# Patient Record
Sex: FEMALE | Race: OTHER | Employment: STUDENT | URBAN - METROPOLITAN AREA
[De-identification: names, ages, dates, MRNs, and addresses within clinical notes are randomized per-mention and may not be internally consistent; named-entity substitution may affect disease eponyms.]

---

## 2022-02-08 ENCOUNTER — HOSPITAL ENCOUNTER (EMERGENCY)
Age: 22
Discharge: HOME | End: 2022-02-08
Attending: EMERGENCY MEDICINE
Payer: MEDICAID

## 2022-02-08 ENCOUNTER — APPOINTMENT (OUTPATIENT)
Dept: ULTRASOUND IMAGING | Age: 22
End: 2022-02-08
Attending: PHYSICIAN ASSISTANT
Payer: MEDICAID

## 2022-02-08 VITALS
OXYGEN SATURATION: 99 % | RESPIRATION RATE: 18 BRPM | SYSTOLIC BLOOD PRESSURE: 129 MMHG | DIASTOLIC BLOOD PRESSURE: 75 MMHG | WEIGHT: 124 LBS | TEMPERATURE: 98 F | HEART RATE: 81 BPM

## 2022-02-08 DIAGNOSIS — O03.4 INCOMPLETE ABORTION: Primary | ICD-10-CM

## 2022-02-08 DIAGNOSIS — O02.0 BLIGHTED OVUM: ICD-10-CM

## 2022-02-08 PROBLEM — O03.9 SPONTANEOUS MISCARRIAGE (HCC): Status: ACTIVE | Noted: 2022-02-08

## 2022-02-08 PROBLEM — O03.9 SPONTANEOUS MISCARRIAGE: Status: ACTIVE | Noted: 2022-02-08

## 2022-02-08 LAB
ALBUMIN SERPL-MCNC: 4 G/DL (ref 3.4–5)
ALBUMIN/GLOB SERPL: 1.1 {RATIO} (ref 1–2)
ALP LIVER SERPL-CCNC: 58 U/L
ALT SERPL-CCNC: 18 U/L
ANION GAP SERPL CALC-SCNC: 5 MMOL/L (ref 0–18)
AST SERPL-CCNC: 11 U/L (ref 15–37)
B-HCG SERPL-ACNC: ABNORMAL MIU/ML
BASOPHILS NFR BLD AUTO: 0.3 %
BILIRUB SERPL-MCNC: 1 MG/DL (ref 0.1–2)
BILIRUB UR QL STRIP.AUTO: NEGATIVE
BUN BLD-MCNC: 6 MG/DL (ref 7–18)
CALCIUM BLD-MCNC: 9.4 MG/DL (ref 8.5–10.1)
CHLORIDE SERPL-SCNC: 108 MMOL/L (ref 98–112)
CO2 SERPL-SCNC: 27 MMOL/L (ref 21–32)
CREAT BLD-MCNC: 0.83 MG/DL
EOSINOPHIL # BLD AUTO: 0.1 X10(3) UL (ref 0–0.7)
EOSINOPHIL NFR BLD AUTO: 1 %
ERYTHROCYTE [DISTWIDTH] IN BLOOD BY AUTOMATED COUNT: 13.1 %
GLOBULIN PLAS-MCNC: 3.6 G/DL (ref 2.8–4.4)
GLUCOSE BLD-MCNC: 87 MG/DL (ref 70–99)
GLUCOSE UR STRIP.AUTO-MCNC: NEGATIVE MG/DL
HCT VFR BLD AUTO: 40.8 %
HGB BLD-MCNC: 14 G/DL
IMM GRANULOCYTES # BLD AUTO: 0.02 X10(3) UL (ref 0–1)
IMM GRANULOCYTES NFR BLD: 0.2 %
KETONES UR STRIP.AUTO-MCNC: NEGATIVE MG/DL
LYMPHOCYTES # BLD AUTO: 2.28 X10(3) UL (ref 1.5–5)
LYMPHOCYTES NFR BLD AUTO: 23.7 %
MCH RBC QN AUTO: 30.6 PG (ref 26–34)
MCHC RBC AUTO-ENTMCNC: 34.3 G/DL (ref 31–37)
MCV RBC AUTO: 89.1 FL
MONOCYTES # BLD AUTO: 0.86 X10(3) UL (ref 0.1–1)
MONOCYTES NFR BLD AUTO: 8.9 %
NEUTROPHILS # BLD AUTO: 6.33 X10 (3) UL (ref 1.5–7.7)
NEUTROPHILS # BLD AUTO: 6.33 X10(3) UL (ref 1.5–7.7)
NEUTROPHILS NFR BLD AUTO: 65.9 %
NITRITE UR QL STRIP.AUTO: NEGATIVE
OSMOLALITY SERPL CALC.SUM OF ELEC: 287 MOSM/KG (ref 275–295)
PH UR STRIP.AUTO: 7 [PH] (ref 5–8)
PLATELET # BLD AUTO: 213 10(3)UL (ref 150–450)
POTASSIUM SERPL-SCNC: 3.5 MMOL/L (ref 3.5–5.1)
PROT SERPL-MCNC: 7.6 G/DL (ref 6.4–8.2)
PROT UR STRIP.AUTO-MCNC: NEGATIVE MG/DL
RBC # BLD AUTO: 4.58 X10(6)UL
RBC #/AREA URNS AUTO: >10 /HPF
RH BLOOD TYPE: POSITIVE
SODIUM SERPL-SCNC: 140 MMOL/L (ref 136–145)
SP GR UR STRIP.AUTO: 1.02 (ref 1–1.03)
WBC # BLD AUTO: 9.6 X10(3) UL (ref 4–11)

## 2022-02-08 PROCEDURE — 86900 BLOOD TYPING SEROLOGIC ABO: CPT | Performed by: PHYSICIAN ASSISTANT

## 2022-02-08 PROCEDURE — 96360 HYDRATION IV INFUSION INIT: CPT

## 2022-02-08 PROCEDURE — 76817 TRANSVAGINAL US OBSTETRIC: CPT | Performed by: PHYSICIAN ASSISTANT

## 2022-02-08 PROCEDURE — 84702 CHORIONIC GONADOTROPIN TEST: CPT | Performed by: PHYSICIAN ASSISTANT

## 2022-02-08 PROCEDURE — 80053 COMPREHEN METABOLIC PANEL: CPT | Performed by: PHYSICIAN ASSISTANT

## 2022-02-08 PROCEDURE — 85025 COMPLETE CBC W/AUTO DIFF WBC: CPT | Performed by: PHYSICIAN ASSISTANT

## 2022-02-08 PROCEDURE — 87086 URINE CULTURE/COLONY COUNT: CPT | Performed by: PHYSICIAN ASSISTANT

## 2022-02-08 PROCEDURE — 86901 BLOOD TYPING SEROLOGIC RH(D): CPT | Performed by: PHYSICIAN ASSISTANT

## 2022-02-08 PROCEDURE — 99285 EMERGENCY DEPT VISIT HI MDM: CPT

## 2022-02-08 PROCEDURE — 81015 MICROSCOPIC EXAM OF URINE: CPT | Performed by: PHYSICIAN ASSISTANT

## 2022-02-08 PROCEDURE — 76801 OB US < 14 WKS SINGLE FETUS: CPT | Performed by: PHYSICIAN ASSISTANT

## 2022-02-08 PROCEDURE — 81001 URINALYSIS AUTO W/SCOPE: CPT | Performed by: PHYSICIAN ASSISTANT

## 2022-02-08 PROCEDURE — 99284 EMERGENCY DEPT VISIT MOD MDM: CPT

## 2022-02-08 RX ORDER — PRENATAL VIT/IRON FUM/FOLIC AC 27MG-0.8MG
1 TABLET ORAL DAILY
COMMUNITY

## 2022-02-08 NOTE — ED QUICK NOTES
Rounding Completed    Plan of Care reviewed. Waiting for pt's bladder to feel full for ultrasound. Provided pt with water to drink. Fluids finished and documented. Family at bedside for comfort    Bed is locked and in lowest position. Call light within reach.

## 2022-02-08 NOTE — ED QUICK NOTES
This RN at the bedside with [facility] for pelvic exam. Provided pt with wipes, a pad and underwear

## 2022-02-24 ENCOUNTER — TELEPHONE (OUTPATIENT)
Dept: OBGYN UNIT | Facility: HOSPITAL | Age: 22
End: 2022-02-24

## 2022-11-28 NOTE — ED INITIAL ASSESSMENT (HPI)
Pt states she started bleeding this morning and is 2 mos pregnant. . Pt also is cramping.
MOLECULAR PCR

## 2023-08-01 ENCOUNTER — TELEPHONE (OUTPATIENT)
Dept: OBGYN CLINIC | Facility: CLINIC | Age: 23
End: 2023-08-01

## 2023-08-01 NOTE — TELEPHONE ENCOUNTER
Pt Name and  verified. Pt states that she is requesting an US for tomorrow due to some light bleeding. Pt is currently 6 weeks and the spotting. Pt has not seen MW for pregnancy. Pt offered apt today with MW and pt unable to come in. Pt to come in tomorrow to see MBW. Pt advised to go to ED if bleeding becomes red in color or she experiences pain.  VU.

## 2023-08-02 ENCOUNTER — OFFICE VISIT (OUTPATIENT)
Dept: OBGYN CLINIC | Facility: CLINIC | Age: 23
End: 2023-08-02

## 2023-08-02 VITALS — DIASTOLIC BLOOD PRESSURE: 69 MMHG | SYSTOLIC BLOOD PRESSURE: 108 MMHG | WEIGHT: 130 LBS

## 2023-08-02 DIAGNOSIS — N92.6 MISSED MENSES: ICD-10-CM

## 2023-08-02 DIAGNOSIS — O21.9 NAUSEA AND VOMITING DURING PREGNANCY: Primary | ICD-10-CM

## 2023-08-02 LAB
CONTROL LINE PRESENT WITH A CLEAR BACKGROUND (YES/NO): YES YES/NO
KIT LOT #: NORMAL NUMERIC
PREGNANCY TEST, URINE: POSITIVE

## 2023-08-02 PROCEDURE — 81025 URINE PREGNANCY TEST: CPT | Performed by: ADVANCED PRACTICE MIDWIFE

## 2023-08-02 PROCEDURE — 3078F DIAST BP <80 MM HG: CPT | Performed by: ADVANCED PRACTICE MIDWIFE

## 2023-08-02 PROCEDURE — 3074F SYST BP LT 130 MM HG: CPT | Performed by: ADVANCED PRACTICE MIDWIFE

## 2023-08-02 PROCEDURE — 99203 OFFICE O/P NEW LOW 30 MIN: CPT | Performed by: ADVANCED PRACTICE MIDWIFE

## 2023-08-02 NOTE — PROGRESS NOTES
HPI:   Jluis Luz is a 26year old female who presents for a missed menses visit. Happy about pregnancy. FOB mother rosaline. Had er visit at [address] [place] [address] yesterday - got fluids. Abigail Presume was able to see normal heartbeat on ultrasound. Wt Readings from Last 3 Encounters:  23 : 130 lb (59 kg)  22 : 124 lb (56.2 kg) (42 %, Z= -0.20)*  16 : 104 lb (47.2 kg) (37 %, Z= -0.34)*    * Growth percentiles are based on CDC (Girls, 2-20 Years) data. There is no height or weight on file to calculate BMI. OB History    Para Term  AB Living   2       1     SAB IAB Ectopic Multiple Live Births   1              # Outcome Date GA Lbr Babak/2nd Weight Sex Delivery Anes PTL Lv   2 Current            1 SAB  8w0d             Birth Comments: No D&C        Current Outpatient Medications   Medication Sig Dispense Refill    Prenatal 27-0.8 MG Oral Tab Take 1 tablet by mouth daily. History reviewed. No pertinent past medical history. History reviewed. No pertinent surgical history. Family History   Problem Relation Age of Onset    Stroke Maternal Grandmother     Cancer Paternal Grandmother         ovarian cancer? Heart Disease Neg       Social History:   Social History     Socioeconomic History    Marital status: Single   Tobacco Use    Smoking status: Never    Tobacco comments:     No smokers in home. Substance and Sexual Activity    Alcohol use: Not Currently    Drug use: Never        REVIEW OF SYSTEMS:   GENERAL: tired, denies fever, chills and bodyaches. BREAST: some diffuse breast tenderness  GI: denies abdominal pain, +nausea + vomiting  : denies urinary complaints (frequency, dysuria, urgency), denies malodorous vaginal discharge or itching, reports periods regular prior to missed menses   MUSCULOSKELETAL: denies back pain  PSYCHE: denies depression or anxiety.     EXAM:   /69   Wt 130 lb (59 kg)   LMP 2023 (Exact Date)   GENERAL: well developed, well nourished,in no apparent distress  Normal mucous membranes. LUNGS: normal effort  GI: no masses, hernia or tenderness  NEURO: Oriented times three, motor and sensory are grossly intact    ASSESSMENT AND PLAN:   Vinay Monterroso is a 26year old female who presents for an ER followup     1. Missed menses  - URINE PREGNANCY TEST  Records request from ER visit/   CNM care, philosophy and protocols reviewed and accepted. Discussed folic acid & B6 supplementation. Appropriate for RN education visit. Early ultrasound / blood work to confirm viability discussed. Reviewed danger signs of miscarriage and CNM contact information. Requesting work restrictions. Advised not many at this time. Note for visit today.      Pastora Rodriguez [place]  8/2/2023  11:57 AM

## 2023-08-07 ENCOUNTER — TELEPHONE (OUTPATIENT)
Dept: OBGYN CLINIC | Facility: CLINIC | Age: 23
End: 2023-08-07

## 2023-08-22 ENCOUNTER — NURSE ONLY (OUTPATIENT)
Dept: OBGYN CLINIC | Facility: CLINIC | Age: 23
End: 2023-08-22

## 2023-08-22 DIAGNOSIS — Z34.91 ENCOUNTER FOR SUPERVISION OF NORMAL PREGNANCY IN FIRST TRIMESTER, UNSPECIFIED GRAVIDITY: Primary | ICD-10-CM

## 2023-08-22 NOTE — PROGRESS NOTES
/Pt called today for RN [facility] Education. Missed menses apt with:  Allison CNM  LMP: 23  Pre  BMI: 22.14  EPDS score: 0/30  +UPT in office:     US: Per pt, US was completed at [facility] ED. Pt advised to send US report via [employer] or bring copy to next visit. Working LIZZ: 3/25/23  Hx of genetic abnormality in family: FOB sister with Down's Syndrome    OUD Screening: Pt. Has answered NO 5P questions and has NO  risk factors. Educational material reviewed with patient: Prenatal care, nutrition, fetal movement, labor and  labor, warning signs, food safety, tdap, breastfeeding, circumcision, and Group B strep. PN labs: 1st trimester labs    Optional genetic screening labs were reviewed: Bruce Mike, FTS with US, Quad screen MSAFP and CF screening. Pt declines at this time, but wants to think about it. Pt advised to call office or send message if she changes her mind.  VU.    [facility] Media Policy: reviewed    NOB apt:

## 2023-09-14 ENCOUNTER — LAB ENCOUNTER (OUTPATIENT)
Dept: LAB | Age: 23
End: 2023-09-14
Attending: ADVANCED PRACTICE MIDWIFE
Payer: MEDICAID

## 2023-09-14 DIAGNOSIS — Z34.91 ENCOUNTER FOR SUPERVISION OF NORMAL PREGNANCY IN FIRST TRIMESTER, UNSPECIFIED GRAVIDITY: ICD-10-CM

## 2023-09-14 LAB
ANTIBODY SCREEN: NEGATIVE
BASOPHILS # BLD AUTO: 0.02 X10(3) UL (ref 0–0.2)
BASOPHILS NFR BLD AUTO: 0.3 %
EOSINOPHIL # BLD AUTO: 0.1 X10(3) UL (ref 0–0.7)
EOSINOPHIL NFR BLD AUTO: 1.5 %
ERYTHROCYTE [DISTWIDTH] IN BLOOD BY AUTOMATED COUNT: 12.5 %
HBV SURFACE AG SER-ACNC: <0.1 [IU]/L
HBV SURFACE AG SERPL QL IA: NONREACTIVE
HCT VFR BLD AUTO: 38.5 %
HCV AB SERPL QL IA: NONREACTIVE
HGB BLD-MCNC: 13.3 G/DL
IMM GRANULOCYTES # BLD AUTO: 0.02 X10(3) UL (ref 0–1)
IMM GRANULOCYTES NFR BLD: 0.3 %
LYMPHOCYTES # BLD AUTO: 1.95 X10(3) UL (ref 1–4)
LYMPHOCYTES NFR BLD AUTO: 28.6 %
MCH RBC QN AUTO: 29.7 PG (ref 26–34)
MCHC RBC AUTO-ENTMCNC: 34.5 G/DL (ref 31–37)
MCV RBC AUTO: 85.9 FL
MONOCYTES # BLD AUTO: 0.62 X10(3) UL (ref 0.1–1)
MONOCYTES NFR BLD AUTO: 9.1 %
NEUTROPHILS # BLD AUTO: 4.1 X10 (3) UL (ref 1.5–7.7)
NEUTROPHILS # BLD AUTO: 4.1 X10(3) UL (ref 1.5–7.7)
NEUTROPHILS NFR BLD AUTO: 60.2 %
PLATELET # BLD AUTO: 196 10(3)UL (ref 150–450)
RBC # BLD AUTO: 4.48 X10(6)UL
RH BLOOD TYPE: POSITIVE
RUBV IGG SER QL: POSITIVE
RUBV IGG SER-ACNC: 269.6 IU/ML (ref 10–?)
T PALLIDUM AB SER QL IA: NONREACTIVE
WBC # BLD AUTO: 6.8 X10(3) UL (ref 4–11)

## 2023-09-14 PROCEDURE — 36415 COLL VENOUS BLD VENIPUNCTURE: CPT

## 2023-09-14 PROCEDURE — 86762 RUBELLA ANTIBODY: CPT

## 2023-09-14 PROCEDURE — 83021 HEMOGLOBIN CHROMOTOGRAPHY: CPT

## 2023-09-14 PROCEDURE — 87389 HIV-1 AG W/HIV-1&-2 AB AG IA: CPT

## 2023-09-14 PROCEDURE — 86803 HEPATITIS C AB TEST: CPT

## 2023-09-14 PROCEDURE — 85025 COMPLETE CBC W/AUTO DIFF WBC: CPT

## 2023-09-14 PROCEDURE — 83020 HEMOGLOBIN ELECTROPHORESIS: CPT

## 2023-09-14 PROCEDURE — 87086 URINE CULTURE/COLONY COUNT: CPT

## 2023-09-14 PROCEDURE — 86780 TREPONEMA PALLIDUM: CPT

## 2023-09-14 PROCEDURE — 86901 BLOOD TYPING SEROLOGIC RH(D): CPT

## 2023-09-14 PROCEDURE — 86787 VARICELLA-ZOSTER ANTIBODY: CPT

## 2023-09-14 PROCEDURE — 86850 RBC ANTIBODY SCREEN: CPT

## 2023-09-14 PROCEDURE — 86900 BLOOD TYPING SEROLOGIC ABO: CPT

## 2023-09-14 PROCEDURE — 87340 HEPATITIS B SURFACE AG IA: CPT

## 2023-09-15 LAB — VZV IGG SER IA-ACNC: 1950 (ref 165–?)

## 2023-09-20 ENCOUNTER — INITIAL PRENATAL (OUTPATIENT)
Dept: OBGYN CLINIC | Facility: CLINIC | Age: 23
End: 2023-09-20

## 2023-09-20 ENCOUNTER — TELEPHONE (OUTPATIENT)
Dept: OBGYN CLINIC | Facility: CLINIC | Age: 23
End: 2023-09-20

## 2023-09-20 VITALS — WEIGHT: 126 LBS | DIASTOLIC BLOOD PRESSURE: 85 MMHG | SYSTOLIC BLOOD PRESSURE: 130 MMHG | HEART RATE: 118 BPM

## 2023-09-20 DIAGNOSIS — Z34.91 ENCOUNTER FOR SUPERVISION OF NORMAL PREGNANCY IN FIRST TRIMESTER, UNSPECIFIED GRAVIDITY: Primary | ICD-10-CM

## 2023-09-20 PROBLEM — Z34.90 PREGNANCY (HCC): Status: ACTIVE | Noted: 2023-09-20

## 2023-09-20 PROBLEM — O03.9 SPONTANEOUS MISCARRIAGE: Status: RESOLVED | Noted: 2022-02-08 | Resolved: 2023-09-20

## 2023-09-20 PROBLEM — O03.9 SPONTANEOUS MISCARRIAGE (HCC): Status: RESOLVED | Noted: 2022-02-08 | Resolved: 2023-09-20

## 2023-09-20 PROBLEM — Z34.90 PREGNANCY: Status: ACTIVE | Noted: 2023-09-20

## 2023-09-20 LAB
HGB A2 MFR BLD: 2.8 % (ref 1.5–3.5)
HGB PNL BLD ELPH: 97.2 % (ref 95.5–100)

## 2023-09-20 PROCEDURE — 3079F DIAST BP 80-89 MM HG: CPT | Performed by: ADVANCED PRACTICE MIDWIFE

## 2023-09-20 PROCEDURE — 3075F SYST BP GE 130 - 139MM HG: CPT | Performed by: ADVANCED PRACTICE MIDWIFE

## 2023-09-20 PROCEDURE — 0500F INITIAL PRENATAL CARE VISIT: CPT | Performed by: ADVANCED PRACTICE MIDWIFE

## 2023-09-20 RX ORDER — CHOLECALCIFEROL (VITAMIN D3) 25 MCG
1 TABLET,CHEWABLE ORAL DAILY
Qty: 30 CAPSULE | Refills: 11 | Status: SHIPPED | OUTPATIENT
Start: 2023-09-20

## 2023-09-20 NOTE — PROGRESS NOTES
Pt denies nay complaints Some nausea remains able to eat  Recommended Unisom at night as need Rx for PNV sent to pharmacy.   LUZ MARIA ofr ultrasound completed  NOB labs reviewed All questions answered  Warning signs reviewed

## 2023-09-20 NOTE — TELEPHONE ENCOUNTER
LUZ MARIA form signed. Need to Request ultrasound records from [facility] ER.     8/1 visit to [place] [address] ER. Call to [facility] told to call   [phone] option [phone] to speak to . Forwarded to Medical Records    Asked to fax LUZ MARIA to [phone] faxed to medical records. Awaiting report.

## 2023-10-09 ENCOUNTER — TELEPHONE (OUTPATIENT)
Dept: OBGYN CLINIC | Facility: CLINIC | Age: 23
End: 2023-10-09

## 2023-10-09 NOTE — TELEPHONE ENCOUNTER
Incoming Fax received from [place] with patient's Panorama test results. Sample collection date: 10/2/23  Report date: 10/7/23    Results: Low Risk  Low Risk for Aneuploidies and Microdeletions  Fetal Sex: Female  Fetal Fraction:  10.8%    Placed in [address] for review.

## 2023-10-12 ENCOUNTER — TELEPHONE (OUTPATIENT)
Dept: OBGYN CLINIC | Facility: CLINIC | Age: 23
End: 2023-10-12

## 2023-10-12 NOTE — TELEPHONE ENCOUNTER
LMTCB    ----- Message from Arlen Rodgers CNM sent at 10/12/2023  3:40 PM CDT -----  Regarding: NIPT  Please call patient. NIPT low risk. Predicted fetal sex is female if she desires to know. Thanks!

## 2023-10-24 NOTE — PROGRESS NOTES
US from 8/1/23 [facility] received 9/20/23. Entered into Dating tab and sent to be scanned 10/24/23.   Swati Kearns CNM

## 2023-10-26 ENCOUNTER — LAB ENCOUNTER (OUTPATIENT)
Dept: LAB | Facility: HOSPITAL | Age: 23
End: 2023-10-26
Attending: ADVANCED PRACTICE MIDWIFE

## 2023-10-26 ENCOUNTER — ROUTINE PRENATAL (OUTPATIENT)
Dept: OBGYN CLINIC | Facility: CLINIC | Age: 23
End: 2023-10-26

## 2023-10-26 VITALS — DIASTOLIC BLOOD PRESSURE: 80 MMHG | WEIGHT: 131.13 LBS | SYSTOLIC BLOOD PRESSURE: 114 MMHG | HEART RATE: 85 BPM

## 2023-10-26 DIAGNOSIS — Z34.02 ENCOUNTER FOR SUPERVISION OF NORMAL FIRST PREGNANCY IN SECOND TRIMESTER: ICD-10-CM

## 2023-10-26 DIAGNOSIS — Z34.02 ENCOUNTER FOR SUPERVISION OF NORMAL FIRST PREGNANCY IN SECOND TRIMESTER: Primary | ICD-10-CM

## 2023-10-26 PROCEDURE — 36415 COLL VENOUS BLD VENIPUNCTURE: CPT

## 2023-10-26 PROCEDURE — 82105 ALPHA-FETOPROTEIN SERUM: CPT

## 2023-10-26 PROCEDURE — 99213 OFFICE O/P EST LOW 20 MIN: CPT | Performed by: ADVANCED PRACTICE MIDWIFE

## 2023-10-26 PROCEDURE — 3074F SYST BP LT 130 MM HG: CPT | Performed by: ADVANCED PRACTICE MIDWIFE

## 2023-10-26 PROCEDURE — 3079F DIAST BP 80-89 MM HG: CPT | Performed by: ADVANCED PRACTICE MIDWIFE

## 2023-10-26 NOTE — PROGRESS NOTES
Alexa Tavarez is a 26year old , at 18w3d, here for her return OB visit. Currently, she is feeling well. Denies contractions, bleeding, and leakage of fluid. Endorses active fetus. Vital signs and weight reviewed  See flowsheets     Today's Assessment/Plan:   Anatomy US ordered and pt instructed to schedule  AFP offered and pt desires    Next visit: Follow up OB: 4 weeks    Reviewed:   2nd trimester precautions and expectations   labor precautions  Danger signs  Prenatal visit schedule      Pt verbalized understanding. All questions answered.  No barriers to learning identified
No

## 2023-10-28 LAB
AFP MOM: 0.74
AFP VALUE: 35.8 NG/ML
GA ON COLL DATE: 18 WEEKS
INSULIN DEP AFP: NO
MAT AGE AT EDD: 21.8 YR
MULTIPLE GEST AFP: NO
OSBR RISK 1 IN AFP: NORMAL
WEIGHT AFP: 131 LBS

## 2023-11-10 ENCOUNTER — HOSPITAL ENCOUNTER (OUTPATIENT)
Dept: PERINATAL CARE | Facility: HOSPITAL | Age: 23
Discharge: HOME | End: 2023-11-10
Attending: OBSTETRICS & GYNECOLOGY
Payer: MEDICAID

## 2023-11-10 ENCOUNTER — HOSPITAL ENCOUNTER (OUTPATIENT)
Dept: PERINATAL CARE | Facility: HOSPITAL | Age: 23
Discharge: HOME | End: 2023-11-10
Attending: ADVANCED PRACTICE MIDWIFE
Payer: MEDICAID

## 2023-11-10 VITALS — HEART RATE: 74 BPM | DIASTOLIC BLOOD PRESSURE: 80 MMHG | WEIGHT: 131 LBS | SYSTOLIC BLOOD PRESSURE: 120 MMHG

## 2023-11-10 DIAGNOSIS — Z36.89 ENCOUNTER FOR FETAL ANATOMIC SURVEY: ICD-10-CM

## 2023-11-10 DIAGNOSIS — Z03.75 SUSPECTED CERVICAL SHORTENING NOT FOUND: ICD-10-CM

## 2023-11-10 DIAGNOSIS — Z36.89 ENCOUNTER FOR FETAL ANATOMIC SURVEY: Primary | ICD-10-CM

## 2023-11-10 PROCEDURE — 76817 TRANSVAGINAL US OBSTETRIC: CPT

## 2023-11-10 PROCEDURE — 76805 OB US >/= 14 WKS SNGL FETUS: CPT | Performed by: OBSTETRICS & GYNECOLOGY

## 2023-11-10 NOTE — PROGRESS NOTES
STANDARD OBSTETRIC ULTRASOUND REPORT   See imaging tab for complete consultation / ultrasound report      Ultrasound Findings:  Single IUP in cephalic presentation. Placenta is posterior. A 3 vessel cord is noted. Cardiac activity is present at 150 bpm   g ( 0 lb 14 oz);    MVP is 4.2 cm . The cervix was not able to be clearly visualized and appeared short by transabdominal ultrasound, therefore transvaginal ultrasound was performed. Transvaginal US findings: Cervix is closed, long and no funneling seen measuring 29.9 mm     Fetal Anatomy:  Visualized with normal appearance: head, face, spine, neck, skin, chest, abdominal wall, gastrointestinal tract, kidneys, bladder, extremities. Brain: Visualized and normal appearances: brain parenchyma, cerebral ventricles, choroid plexus, Cisterna Magna, midline falx, cerebellum, cerebellar lobes, posterior fossa, vermis, cavum septi pellucidi. Face: eyes normal, profile normal, nose normal, lip normal, palate normal.  Heart: visualized and normal appearance: 3 vessel view, four-chamber, left outflow tract, right outflow tract, arches. Genetic Sonogram:  Nuchal fold normal.    Pylelectasis absent. No hyperechogenic bowel. Echogenic intracardiac foci absent. Nasal bone present. Choroid plexus cyst absent. Summary of Ultrasound findings: This is a [place] pregnancy       The fetal measurements are consistent with established EDC. No gross ultrasound evidence of structural abnormalities are seen today. No minor markers for aneuploidy are seen. The patient understands that ultrasound cannot rule out all structural and chromosomal abnormalities. IMPRESSION  IUP @ 20w4d  Scan consistent with dates  No fetal structural abnormalities seen  Short cervix not found    RECOMMENDATIONS:  Routine antepartum care    Raúl Riojas MD      This was an ultrasound only encounter (no physician visit).   The ultrasound was read by Dr. Hilario Santana and the report was sent to Ms. Edwards to discuss with the patient. Note to patient and family  The [facility] makes medical notes available to patients in the interest of transparency. However, please be advised that this is a medical document. It is intended as [id number] communication. It is written and medical language may contain abbreviations or verbiage that are technical and unfamiliar. It may appear blunt or direct. Medical documents are intended to carry relevant information, facts as evident, and the clinical opinion of the practitioner.

## 2023-12-04 ENCOUNTER — PATIENT MESSAGE (OUTPATIENT)
Dept: OBGYN CLINIC | Facility: CLINIC | Age: 23
End: 2023-12-04

## 2023-12-04 NOTE — TELEPHONE ENCOUNTER
From: Bertrand Moreno  To: Steve Edwards  Sent: 12/4/2023 10:30 AM CST  Subject: Pregnancy     Hi today I am reaching my 6month and my lower abdomen has been bothering me tremendously it feels like a pulling/pressure sensation.

## 2023-12-04 NOTE — TELEPHONE ENCOUNTER
CNM called pt at number in chart. Name and  confirmed. Pt reports since having sex last week she's feel vaginal itching and lower abdominal discomfort that comes and goes. She reports it feels mostly on her right side and is a sharp pulling sensation. It is worse with walking/movement. She denies contractions/tightening, VB or LOF. Reports normal fetal movement. Pt reports vaginal discharge is more yellow than usual. She denies urinary frequency or urgency, denies dysuria. She has a prenatal visit in 4 days. Pt informed it is preferred for her to be seen in the office by a CNM however it would be fine for her to go to the urgent care close to her house to be ruled out for a yeast infection and UTI per her preference due to the proximity to her house. Pt encouraged to forward all results to our office and discuss theses symptoms with the CNM at her upcoming prenatal visit. She was given warning signs and instructed to call if any changes or worsening of her symptoms. Pt verbalized understanding and agreement with all instructions and plan.

## 2023-12-05 ENCOUNTER — TELEPHONE (OUTPATIENT)
Dept: OBGYN CLINIC | Facility: CLINIC | Age: 23
End: 2023-12-05

## 2023-12-05 RX ORDER — METRONIDAZOLE 7.5 MG/G
1 GEL VAGINAL NIGHTLY
Qty: 70 G | Refills: 0 | Status: SHIPPED | OUTPATIENT
Start: 2023-12-05 | End: 2023-12-10

## 2023-12-05 NOTE — TELEPHONE ENCOUNTER
Pt dx with UTI over the weekend. Advised pt Macrobid is safe to take with pregnancy. S/S of UTI reviewed with pt. Advised pt to drink a lot of water/cranberry juice, and to call the office if symptoms don't start to get better or worsen after being on macrobid for 24-48 hours or if she has a fever or blood in her urine. Pt voices understanding.

## 2023-12-05 NOTE — TELEPHONE ENCOUNTER
Pt was prescribed nitrofurantoin, in UC and wondering if ok to take, will like to speak with MB.  Please advise

## 2023-12-06 NOTE — TELEPHONE ENCOUNTER
Phone call to patient to give her the [facility] office phone number so she can have the urgent care she went to send over the records. Pt reports she is currently taking antibiotics for a UTI. Urinary symptoms have improved since starting those antibiotics. She reports they just called her and told her she also has BV and a yeast infection. They prescribed her another oral antibiotic for the BV and a vaginal insert for the yeast. She prefers not to take another oral antibiotic. Offered to Rx for metrogel and pt desires. Pt instructed to complete metrogel x 5 nights first then start the yeast treatment. Pt verbalized understanding and agreement.

## 2023-12-08 ENCOUNTER — ROUTINE PRENATAL (OUTPATIENT)
Dept: OBGYN CLINIC | Facility: CLINIC | Age: 23
End: 2023-12-08

## 2023-12-08 VITALS — DIASTOLIC BLOOD PRESSURE: 81 MMHG | HEART RATE: 91 BPM | WEIGHT: 141.38 LBS | SYSTOLIC BLOOD PRESSURE: 117 MMHG

## 2023-12-08 DIAGNOSIS — Z34.82 ENCOUNTER FOR SUPERVISION OF OTHER NORMAL PREGNANCY IN SECOND TRIMESTER: Primary | ICD-10-CM

## 2023-12-08 PROCEDURE — 3079F DIAST BP 80-89 MM HG: CPT | Performed by: ADVANCED PRACTICE MIDWIFE

## 2023-12-08 PROCEDURE — 99213 OFFICE O/P EST LOW 20 MIN: CPT | Performed by: ADVANCED PRACTICE MIDWIFE

## 2023-12-08 PROCEDURE — 3074F SYST BP LT 130 MM HG: CPT | Performed by: ADVANCED PRACTICE MIDWIFE

## 2023-12-08 RX ORDER — NITROFURANTOIN 25; 75 MG/1; MG/1
CAPSULE ORAL
COMMUNITY
Start: 2023-12-04

## 2023-12-08 NOTE — PROGRESS NOTES
Olegario Alexis, is at 24w4d, here for her [facility] visit. Currently, she is feeling well. Denies 2nd trimester danger signs. Was seen in immediate care last week for a pulling sensation on her right side. States was diagnosed with BV and yeast and is finishing her treatment. Symptoms improved. Vital signs and weight reviewed  See flowsheets   Anatomy scan WNL and reviewed with patient    Assessment/Plan: 3T labs ordered. Pt to complete after 27w1  Continue treatments for BV and yeast  Next visit: 4 weeks. 3T labs and Tdap then    Reviewed:   Prenatal visit schedule   labor precautions  Kick counts  Danger signs    Pt verbalized understanding. All questions answered.  No barriers to learning identified

## 2024-01-08 ENCOUNTER — TELEPHONE (OUTPATIENT)
Dept: OBGYN CLINIC | Facility: CLINIC | Age: 24
End: 2024-01-08

## 2024-01-09 ENCOUNTER — ROUTINE PRENATAL (OUTPATIENT)
Dept: OBGYN CLINIC | Facility: CLINIC | Age: 24
End: 2024-01-09

## 2024-01-09 ENCOUNTER — LAB ENCOUNTER (OUTPATIENT)
Dept: LAB | Facility: HOSPITAL | Age: 24
End: 2024-01-09
Attending: ADVANCED PRACTICE MIDWIFE
Payer: MEDICAID

## 2024-01-09 VITALS — SYSTOLIC BLOOD PRESSURE: 116 MMHG | DIASTOLIC BLOOD PRESSURE: 79 MMHG | WEIGHT: 147 LBS

## 2024-01-09 DIAGNOSIS — Z34.03 ENCOUNTER FOR SUPERVISION OF NORMAL FIRST PREGNANCY IN THIRD TRIMESTER: Primary | ICD-10-CM

## 2024-01-09 DIAGNOSIS — Z34.82 ENCOUNTER FOR SUPERVISION OF OTHER NORMAL PREGNANCY IN SECOND TRIMESTER: ICD-10-CM

## 2024-01-09 PROBLEM — Z28.20 VACCINE REFUSED BY PATIENT: Status: ACTIVE | Noted: 2024-01-09

## 2024-01-09 LAB
DEPRECATED RDW RBC AUTO: 41.1 FL (ref 35.1–46.3)
ERYTHROCYTE [DISTWIDTH] IN BLOOD BY AUTOMATED COUNT: 12.9 % (ref 11–15)
GLUCOSE 1H P GLC SERPL-MCNC: 126 MG/DL
HCT VFR BLD AUTO: 32.2 %
HGB BLD-MCNC: 10.6 G/DL
MCH RBC QN AUTO: 28.7 PG (ref 26–34)
MCHC RBC AUTO-ENTMCNC: 32.9 G/DL (ref 31–37)
MCV RBC AUTO: 87.3 FL
PLATELET # BLD AUTO: 161 10(3)UL (ref 150–450)
RBC # BLD AUTO: 3.69 X10(6)UL
T PALLIDUM AB SER QL IA: NONREACTIVE
WBC # BLD AUTO: 7.2 X10(3) UL (ref 4–11)

## 2024-01-09 PROCEDURE — 86780 TREPONEMA PALLIDUM: CPT

## 2024-01-09 PROCEDURE — 99213 OFFICE O/P EST LOW 20 MIN: CPT | Performed by: ADVANCED PRACTICE MIDWIFE

## 2024-01-09 PROCEDURE — 3078F DIAST BP <80 MM HG: CPT | Performed by: ADVANCED PRACTICE MIDWIFE

## 2024-01-09 PROCEDURE — 87389 HIV-1 AG W/HIV-1&-2 AB AG IA: CPT

## 2024-01-09 PROCEDURE — 3074F SYST BP LT 130 MM HG: CPT | Performed by: ADVANCED PRACTICE MIDWIFE

## 2024-01-09 PROCEDURE — 36415 COLL VENOUS BLD VENIPUNCTURE: CPT

## 2024-01-09 PROCEDURE — 82950 GLUCOSE TEST: CPT

## 2024-01-09 PROCEDURE — 85027 COMPLETE CBC AUTOMATED: CPT

## 2024-01-09 NOTE — PROGRESS NOTES
Here for LEWIS visit.      Patient's last menstrual period was 2023 (exact date). 3/25/2024, by Last Menstrual Period 29w1d     Baby active. Denies contractions, LOF or bleeding. Some round ligament pain    Labs: Still to do 3rd tri labs. Will go today.   Ultrasound: anatomy ultrasound done  Tdap: Declines Tdap or RSV      28 wk packet given. Fetal kick counts, warning signs and signs PTL reviewed.      My total time spent caring for the patient on the day of the encounter:  20 minutes, which included: chart review, exam, care coordination, charting, and counseling as per above.

## 2024-01-09 NOTE — PATIENT INSTRUCTIONS
Kick Counts  It’s normal to worry about your baby’s health. One way you can know your baby’s doing well is to record the baby’s movements once a day. This is called a kick count.   Remember to take your kick count records to all your appointments with your healthcare provider.  How to count kicks    Time how long it takes you to feel 10 kicks, flutters, swishes, or rolls. Ideally, you want to feel at least 10 movements in 2 hours. You will likely feel 10 movements in less time than that.  Starting at 28 weeks, count your baby's movements daily. Follow your healthcare provider's instructions for kick counting. Here are tips for counting kicks:  Choose a time when the baby is active, such as after a meal.   Sit comfortably or lie on your side.   The first time the baby moves, write down the time.   Count each movement until the baby has moved  10 times. This can take from 20 minutes to 2 hours.   If you haven't felt 10 kicks by the end of the second hour, wait a few hours. Then try again.  Try to do it at the same time each day.  When to call your healthcare provider  Call your healthcare provider  right away if:  You do a couple sets of kick counts during the day and your baby moves fewer than 10 times in 2 hours.  Your baby moves much less often than on the days before.  You haven't felt your baby move all day.  [employer] last reviewed this educational content on 2020    © 7235-7728 [employer]. All rights reserved. This information is not intended as a substitute for professional medical care. Always follow your healthcare professional's instructions. Premature Labor    Premature labor ( labor) is when symptoms of labor occur before 37 weeks of pregnancy. (This is 3 weeks before your due date.) Premature labor can lead to premature delivery. This means giving birth to your baby early. Babies need at least 37 weeks of pregnancy for all the organs to develop normally. The earlier the  delivery, the greater the risks to the baby.  In most cases, the cause of premature labor is unknown. But certain factors may make the problem more likely. These include:  History of premature labor with other pregnancies  Smoking  Alcohol or substance abuse  Low pre-pregnancy weight or weight gain during pregnancy  Short time period between pregnancies  Being pregnant with twins, triplets, or more  History of certain types of surgery on the cervix or uterus  Having a short cervix  Certain infections  There are a number of other risk factors. Ask your healthcare provider to help you understand the risk factors specific to your case. Then find out what you can do to control or reduce them.  Contractions are one of the main signs of premature labor. A contraction is different from cramping. It may feel painful and the belly (abdomen) may get hard. It can last from a few seconds to a few minutes. Some women may feel only a sense of pressure in the belly, thighs, rectum, or vagina. Some may feel only the hardening of the uterus without pain or pressure. Or there may be a constant pain in the lower back, which spreads forward toward the belly.Premature labor is often treated with medicines. A hospital stay may be needed. If labor doesn't progress and you and your baby are both healthy, you may be discharged to continue care at home.  Home care  Ask your provider any questions you have. Be certain you understand how to care for yourself at home. Also follow all recommendations given by your healthcare providers.  Learn the signs of premature labor. Watch for these signs when you get home.  Limit or restrict activities as advised. This may include stopping certain physical activities and cutting back hours at work.  Avoid doing strenuous work as directed by your provider. Ask family and friends for help with tasks and support at home, if needed.  Don’t smoke, drink alcohol, or use other harmful substances.  Take steps to  reduce stress.  Report any unusual symptoms to your provider.    Follow-up care  Follow up with your healthcare provider, or as directed. Weekly visits with your provider may be needed.  When to seek medical advice  Call your healthcare provider right away if any of these occur:  Regular or frequent contractions, whether they are painful or not  Pressure in the pelvis  Pressure in the lower belly or mild cramping in your belly with or without diarrhea  Constant low, dull backache  Gush or slow leaking of water from your vagina  Change in vaginal discharge (watery, mucus, or bloody)  Any vaginal bleeding  Decreased movement of your baby  [employer] last reviewed this educational content on 2018 [employer]. All rights reserved. This information is not intended as a substitute for professional medical care. Always follow your healthcare professional's instructions.     Understanding Preeclampsia  Preeclampsia is a condition that can happen in pregnancy. It includes high blood pressure (hypertension), swelling, and signs of organ problems. It can show up around week 20 of pregnancy. It often goes away by 12 weeks after you give birth. It can lead to serious health risks for you and your baby. During your pregnancy, your healthcare provider will watch your blood pressure.      Your blood pressure will be monitored regularly throughout your pregnancy to help check for preeclampsia.     Dangers of preeclampsia   If not treated, preeclampsia can cause problems for you and your baby. The placenta is the organ that nourishes your baby. It may tear away from the wall of the uterus. This can put the baby at risk for health problems (fetal distress). It can put the baby at risk for  birth. Preeclampsia can also cause these health problems in you:   Kidney failure or other organ damage  Seizures  Stroke  Who’s at risk for preeclampsia?   No one knows what causes preeclampsia. It can happen  in any pregnant person. But there are things that increase your risk. You may need to take a daily low dose of aspirin if you are at risk for preeclampsia.   You’re at higher risk for preeclampsia if you have any of these:  Diabetes  High blood pressure  Obesity  Kidney disease  Autoimmune disease such as lupus  A family history of preeclampsia  You’re at higher risk if any of these apply to you:  This is your first pregnancy  You are having twins or more  You’re under age 18 or over age 40  You used in vitro fertilization  You are Black  And you’re at higher risk if you had any of these in a past pregnancy:   Preeclampsia  Intrauterine growth retardation (IUGR)   birth  Placental abruption  Fetal death  Symptoms  A common symptom of preeclampsia is high blood pressure. Other symptoms may include:   Fast weight gain  Protein in your urine  Headache  Belly (abdominal) pain on your right side  Vision problems such as flashes or spots  Swelling (edema) in your face or hands (this often happens near the end of a normal pregnancy)  Tests you may have   Your healthcare provider will want to check your blood pressure. This will need to be done often in your pregnancy. If your blood pressure is high, you may have these tests:   Urine tests to look for protein  Blood tests to confirm preeclampsia  Fetal monitoring to make sure that your baby is healthy  Treating preeclampsia   Preeclampsia almost always ends soon after you give birth. Until then, your healthcare provider can help you manage it.   If your symptoms are mild, you may to:     Limit your activity  Rest in bed  Not do heavy lifting    If your symptoms are severe, you will stay in the hospital. Treatment here may include:   Limits to your activity. This is to help control your blood pressure. You should not lift anything heavy. You will need to spend 8 hours a day lying down with your feet up.  Magnesium IV (intravenous) drip. This is done during labor. It's  to prevent seizures  Induced labor or  section. Birth of the baby is considered the cure for preeclampsia.  When to call your healthcare provider   Call your healthcare provider if your symptoms start quickly or are severe. This includes swelling, weight gain, or other symptoms. Some cases of preeclampsia are more severe than others. Your symptoms also may change or get worse as you get closer to your due date.   Once you give birth   In most cases, preeclampsia goes away on its own soon after you give birth. This is often by the 12th week after you deliver. Within days after you give birth, your blood pressure, swelling, and other symptoms should get better. But for some people, problems from preeclampsia can continue after birth.   Postpartum preeclampsia   Preeclampsia that starts after birth is rare. There are 2 types:   Postpartum preeclampsia. This may start in the first 48 hours after birth.  Late-onset preeclampsia. This starts more than 48 hours after birth.  Both of these types are rare. But call your healthcare provider right away if you have symptoms of preeclampsia after you give birth.   [employer] last reviewed this educational content on 10/1/2021    © 0141-3219 [employer]. All rights reserved. This information is not intended as a substitute for professional medical care. Always follow your healthcare professional's instructions.

## 2024-01-10 PROBLEM — O99.019 ANEMIA IN PREG-UNSPEC: Status: ACTIVE | Noted: 2024-01-10

## 2024-01-10 PROBLEM — O99.019 ANEMIA IN PREG-UNSPEC (HCC): Status: ACTIVE | Noted: 2024-01-10

## 2024-01-25 ENCOUNTER — ROUTINE PRENATAL (OUTPATIENT)
Dept: OBGYN CLINIC | Facility: CLINIC | Age: 24
End: 2024-01-25
Payer: MEDICAID

## 2024-01-25 VITALS — DIASTOLIC BLOOD PRESSURE: 83 MMHG | WEIGHT: 151 LBS | SYSTOLIC BLOOD PRESSURE: 120 MMHG | HEART RATE: 98 BPM

## 2024-01-25 DIAGNOSIS — Z34.03 ENCOUNTER FOR SUPERVISION OF NORMAL FIRST PREGNANCY IN THIRD TRIMESTER: Primary | ICD-10-CM

## 2024-01-25 PROCEDURE — 3079F DIAST BP 80-89 MM HG: CPT | Performed by: ADVANCED PRACTICE MIDWIFE

## 2024-01-25 PROCEDURE — 99213 OFFICE O/P EST LOW 20 MIN: CPT | Performed by: ADVANCED PRACTICE MIDWIFE

## 2024-01-25 PROCEDURE — 3074F SYST BP LT 130 MM HG: CPT | Performed by: ADVANCED PRACTICE MIDWIFE

## 2024-01-25 NOTE — PATIENT INSTRUCTIONS
Understanding  Labor  Going into labor before your 37th week of pregnancy is called  labor.  labor can cause your baby to be born too soon. This can lead to a number of health problems that may affect your baby.      Before labor, the cervix is thick and closed.      In  labor, the cervix begins to efface (thin) and dilate (open).   Symptoms of  labor   If you think you’re having  labor, get medical help right away. Contractions alone don’t mean you’re in  labor. What matters more are changes in your cervix (the lower end of the uterus). Symptoms of  labor include:   Four or more contractions per hour  Strong contractions  Constant menstrual-like cramping  Low-back pain  Mucous or bloody vaginal discharge  Bleeding or spotting in the second or third trimester  Evaluating  labor   Your healthcare provider will try to find out whether you’re in  labor or whether you’re just having contractions. He or she may watch you for a few hours. The following tests may be done:   Pelvic exam to see if your cervix has effaced (thinned) and dilated (opened)  Uterine activity monitoring to detect contractions  Fetal monitoring to check the health of your baby  Ultrasound to check your baby’s size and position  Amniocentesis to check how mature your baby’s lungs are  Caring for yourself at home   If you have  contractions, but your cervix is still thick and closed, your healthcare provider may ask you to do the following at home:   Drink plenty of water.  Do fewer activities.  Rest in bed on your side.  Don't have intercourse or nipple stimulation.  When to call your healthcare provider   Call your healthcare provider if you notice any of these:   Four or more contractions per hour  Bag of water breaks  Bleeding or spotting  If you need hospital care    labor often requires that you have hospital care and complete bed rest. You may have an IV  (intravenous) line to get fluids. You may be given pills or injections to help prevent contractions. Finally, you may get medicine (corticosteroids) that helps your baby’s lungs mature more quickly.   Are you at risk?   Any pregnant woman can have  labor. It may start for no reason. But these risk factors can increase your chances:   Past  labor or past early birth  Smoking, drug, or alcohol use during pregnancy  Multiple fetuses (twins or more)  Problems with the shape of the uterus  Bleeding during the pregnancy  The dangers of  birth   A baby born too soon may have health problems. This is because the baby didn’t have enough time to mature. Some of the risks for your baby include:   Not breastfeeding or feeding well  Having immature lungs  Bleeding in the brain  Dying  Reaching term   Your goal is to get as close to term as you can before giving birth. The closer you get to term, the higher your chance of having a healthy baby. Work with your healthcare provider. Together, you can take steps that may keep you from giving birth too early.   [employer] last reviewed this educational content on 3/1/2019  © 7197-2212 The [employer]. [address] [place] [address]. All rights reserved. This information is not intended as a substitute for professional medical care. Always follow your healthcare professional's instructions.        Premature Labor    Premature labor ( labor) is when symptoms of labor occur before 37 weeks of pregnancy. (This is 3 weeks before your due date.) Premature labor can lead to premature delivery. This means giving birth to your baby early. Babies need at least 37 weeks of pregnancy for all the organs to develop normally. The earlier the delivery, the greater the risks to the baby.  In most cases, the cause of premature labor is unknown. But certain factors may make the problem more likely. These include:  History of premature labor with other  pregnancies  Smoking  Alcohol or substance abuse  Low pre-pregnancy weight or weight gain during pregnancy  Short time period between pregnancies  Being pregnant with twins, triplets, or more  History of certain types of surgery on the cervix or uterus  Having a short cervix  Certain infections  There are a number of other risk factors. Ask your healthcare provider to help you understand the risk factors specific to your case. Then find out what you can do to control or reduce them.  Contractions are one of the main signs of premature labor. A contraction is different from cramping. It may feel painful and the belly (abdomen) may get hard. It can last from a few seconds to a few minutes. Some women may feel only a sense of pressure in the belly, thighs, rectum, or vagina. Some may feel only the hardening of the uterus without pain or pressure. Or there may be a constant pain in the lower back, which spreads forward toward the belly.Premature labor is often treated with medicines. A hospital stay may be needed. If labor doesn't progress and you and your baby are both healthy, you may be discharged to continue care at home.  Home care  Ask your provider any questions you have. Be certain you understand how to care for yourself at home. Also follow all recommendations given by your healthcare providers.  Learn the signs of premature labor. Watch for these signs when you get home.  Limit or restrict activities as advised. This may include stopping certain physical activities and cutting back hours at work.  Avoid doing strenuous work as directed by your provider. Ask family and friends for help with tasks and support at home, if needed.  Don’t smoke, drink alcohol, or use other harmful substances.  Take steps to reduce stress.  Report any unusual symptoms to your provider.    Follow-up care  Follow up with your healthcare provider, or as directed. Weekly visits with your provider may be needed.  When to seek medical  advice  Call your healthcare provider right away if any of these occur:  Regular or frequent contractions, whether they are painful or not  Pressure in the pelvis  Pressure in the lower belly or mild cramping in your belly with or without diarrhea  Constant low, dull backache  Gush or slow leaking of water from your vagina  Change in vaginal discharge (watery, mucus, or bloody)  Any vaginal bleeding  Decreased movement of your baby  [employer] last reviewed this educational content on 6/1/2018 © 2000-2020 The [employer]. [address] [place] [address]. All rights reserved. This information is not intended as a substitute for professional medical care. Always follow your healthcare professional's instructions.        Understanding Preeclampsia  Preeclampsia is high blood pressure (hypertension) that happens during pregnancy. It often shows up around the 20th week of pregnancy. It often goes back to normal by the 12th week after you give birth. It can lead to serious health risks for you and your baby. During your pregnancy, your healthcare provider will watch your blood pressure.    Symptoms  A common symptom of preeclampsia is high blood pressure. Other symptoms may include:  Rapid weight gain  Protein in your urine  Headache  Belly (abdominal) pain on your right side  Vision problems. These include flashes or spots.  Swelling (edema) in your face or hands. This also often happens near the end of normal pregnancies, even without preeclampsia.  Tests you may have  Your healthcare provider will want to check your blood pressure throughout your pregnancy. If your blood pressure is high, you may have the following tests:  Urine tests to look for protein  Blood tests to confirm preeclampsia  Fetal monitoring to make sure that your baby is healthy  Treating preeclampsia  You may need to take a daily low dose of aspirin if you are at risk for preeclampsia. Preeclampsia almost always ends soon after you  give birth. Until then, your healthcare provider can help manage your condition. If your symptoms are mild, you may need activity limits at home, including bed rest and no heavy lifting. If your symptoms are severe, you will stay in the hospital. Hospital treatment includes:  Activity limits to help control blood pressure. This means no heavy lifting and 8 hours per day lying down with the feet up.  Magnesium IV (intravenous) drip during labor to prevent seizures  Induced labor or surgical delivery by  section. Delivery is considered the cure for preeclampsia.  When to call your healthcare provider  Call your healthcare provider if swelling, weight gain, or other symptoms come on quickly or are severe. Some cases of preeclampsia are more severe than others. Your symptoms also may change or get worse as you get closer to your due date.  Who’s at risk?  No one knows what causes preeclampsia. Preeclampsia can happen in any pregnant woman. But it is more common in first-time pregnancies. Things that increase the risk include:  Previous pregnancies. You are at risk if you had preeclampsia, intrauterine growth retardation (IUGR),  birth, placental abruption, or fetal death in a past pregnancy.  Health history of mother. You are at risk if you have diabetes, high blood pressure, obesity, kidney disease, autoimmune disease such as lupus, or a family history of preeclampsia.  Current pregnancy. You are at risk if this is your first pregnancy, or if you have multiple fetuses, are younger than age 18 or older than 40, or used in vitro fertilization.  Race. You are at risk if you are black.  Dangers of preeclampsia  If not treated, preeclampsia can cause problems for you and your baby. The placenta is the organ that nourishes your baby. It may tear away from the uterine wall. This can put the baby at risk for health problems (fetal distress) and premature birth. Preeclampsia can also cause these health  problems:  Kidney failure or other organ damage  Seizures  Stroke  Once you give birth  In most cases, preeclampsia goes away on its own soon after you give birth. This is often by the 12th week after you give deliver. Within days of delivery, your blood pressure, swelling, and other symptoms should get better. For some women, problems from preeclampsia can continue after delivery.  Postpartum preeclampsia that develops within the first 48 hours after delivery is rare. Another type of postpartum preeclampsia that develops more than 48 hours after delivery is called late-onset preeclampsia. It is also rare. Contact your healthcare provider right away if you have symptoms of preeclampsia after you deliver.  [employer] last reviewed this educational content on 12/1/2019 © 2000-2020 The [employer]. [address] [place] [address], [place] [address]. All rights reserved. This information is not intended as a substitute for professional medical care. Always follow your healthcare professional's instructions.        Kick Counts    It’s normal to worry about your baby’s health. One way you can know your baby’s doing well is to record the baby’s movements once a day. This is called a kick count. Remember to take your kick count records to all your appointments with your healthcare provider.  How to count kicks  Time how long it takes you to feel 10 kicks, flutters, swishes, or rolls. Ideally, you want to feel at least 10 movements within 2 hours. You will likely feel 10 movements in less time than that.  Starting at 28 weeks, count your baby's movements daily. Follow your healthcare provider's instructions for kick counting. Here are tips for counting kicks:  Choose a time when the baby is active, such as after a meal.   Sit comfortably or lie on your side.   The first time the baby moves, write down the time.   Count each movement until the baby has moved 10 times. This can take from 20 minutes to 2 hours.   If you have  not felt 10 kicks by the end of the second hour, wait a few hours. Then try again.  Try to do it at the same time each day.  When to call your healthcare provider  Call your healthcare provider right away if:  You do a couple sets of kick counts during the day and your baby moves fewer than 10 times in 2 hours  Your baby moves much less often than on the days before.  You have not felt your baby move all day.  [employer] last reviewed this educational content on 12/1/2017 © 2000-2020 The [employer]. [address] [place] [address]. All rights reserved. This information is not intended as a substitute for professional medical care. Always follow your healthcare professional's instructions.

## 2024-01-25 NOTE — PROGRESS NOTES
Carola is a 23 year old , at 31w3d, here for her return OB visit.  Currently, she is feeling well. Denies contractions, bleeding, and leakage of fluid. Endorses active fetus. She has not been taking iron but has been drinking beet juice every day.    Vital signs and weight reviewed  See flowsheets     Today's Assessment/Plan:   CBC ordered to check on anemia.     Next visit: Follow up OB: 2 weeks    Reviewed:   3rd trimester precautions and expectations   labor precautions  Kick counts  Danger signs  Prenatal visit schedule    Pt verbalized understanding. All questions answered. No barriers to learning identified    My total time spent caring for the patient on the day of the encounter:  20 minutes, which included: chart review, exam, care coordination, charting, and counseling as per above.

## 2024-02-08 ENCOUNTER — TELEPHONE (OUTPATIENT)
Dept: OBGYN CLINIC | Facility: CLINIC | Age: 24
End: 2024-02-08

## 2024-02-08 ENCOUNTER — ROUTINE PRENATAL (OUTPATIENT)
Dept: OBGYN CLINIC | Facility: CLINIC | Age: 24
End: 2024-02-08

## 2024-02-08 ENCOUNTER — LAB ENCOUNTER (OUTPATIENT)
Dept: LAB | Facility: HOSPITAL | Age: 24
End: 2024-02-08
Attending: ADVANCED PRACTICE MIDWIFE
Payer: MEDICAID

## 2024-02-08 VITALS — HEART RATE: 111 BPM | DIASTOLIC BLOOD PRESSURE: 83 MMHG | WEIGHT: 156 LBS | SYSTOLIC BLOOD PRESSURE: 121 MMHG

## 2024-02-08 DIAGNOSIS — Z34.03 ENCOUNTER FOR SUPERVISION OF NORMAL FIRST PREGNANCY IN THIRD TRIMESTER: Primary | ICD-10-CM

## 2024-02-08 DIAGNOSIS — Z34.03 ENCOUNTER FOR SUPERVISION OF NORMAL FIRST PREGNANCY IN THIRD TRIMESTER: ICD-10-CM

## 2024-02-08 DIAGNOSIS — O99.019 ANEMIA IN PREG-UNSPEC: ICD-10-CM

## 2024-02-08 LAB
DEPRECATED HBV CORE AB SER IA-ACNC: 3.2 NG/ML
DEPRECATED RDW RBC AUTO: 40.7 FL (ref 35.1–46.3)
ERYTHROCYTE [DISTWIDTH] IN BLOOD BY AUTOMATED COUNT: 13.2 % (ref 11–15)
HCT VFR BLD AUTO: 32.9 %
HGB BLD-MCNC: 10.9 G/DL
MCH RBC QN AUTO: 28.6 PG (ref 26–34)
MCHC RBC AUTO-ENTMCNC: 33.1 G/DL (ref 31–37)
MCV RBC AUTO: 86.4 FL
PLATELET # BLD AUTO: 165 10(3)UL (ref 150–450)
RBC # BLD AUTO: 3.81 X10(6)UL
WBC # BLD AUTO: 8.4 X10(3) UL (ref 4–11)

## 2024-02-08 PROCEDURE — 36415 COLL VENOUS BLD VENIPUNCTURE: CPT

## 2024-02-08 PROCEDURE — 99213 OFFICE O/P EST LOW 20 MIN: CPT | Performed by: ADVANCED PRACTICE MIDWIFE

## 2024-02-08 PROCEDURE — 85027 COMPLETE CBC AUTOMATED: CPT

## 2024-02-08 PROCEDURE — 82728 ASSAY OF FERRITIN: CPT

## 2024-02-08 NOTE — PROGRESS NOTES
Carola is a 23 year old , at 33w3d, here for her return OB visit.  Currently, she is feeling well. Denies contractions, bleeding, and leakage of fluid. Endorses active fetus. Reports some sharp pains when there is a lot of fetal movement but its not severe and goes away once baby stops moving so much.    Vital signs and weight reviewed  See flowsheets       Today's Assessment/Plan:   Encouraged pt to go to the lab after her visit to complete the CBC. She verbalized understanding.    Next visit: Follow up OB: 2 weeks    Reviewed:   3rd trimester precautions and expectations   labor precautions  Kick counts  Danger signs  Prenatal visit schedule    Pt verbalized understanding. All questions answered. No barriers to learning identified    My total time spent caring for the patient on the day of the encounter:  20 minutes, which included: chart review, exam, care coordination, charting, and counseling as per above.

## 2024-02-09 NOTE — TELEPHONE ENCOUNTER
Please put through if patient calls back      Attempted to reach patient again, message states VM is invalid.

## 2024-02-09 NOTE — TELEPHONE ENCOUNTER
Patient verified name and     Patient aware of recommendations and agreed. Wants to call and verify with insurance that it is covered. CPT and Diagnoses code provided to patient. Aware infusion center will call for scheduling. Verbalized understanding and agreed. All questions answered.

## 2024-02-12 ENCOUNTER — PATIENT MESSAGE (OUTPATIENT)
Dept: OBGYN CLINIC | Facility: CLINIC | Age: 24
End: 2024-02-12

## 2024-02-13 ENCOUNTER — TELEPHONE (OUTPATIENT)
Dept: OBGYN CLINIC | Facility: CLINIC | Age: 24
End: 2024-02-13

## 2024-02-13 NOTE — TELEPHONE ENCOUNTER
Paged by patient. She report she woke up from sleep gasping for breath. She got up and got something to drink and when she laid back down she felt dizzy and like she was going to pass out. Reports feels like she can't get enough air. She feels ok when up and moving around. Denies chest pain and heart palpitations. She reports she did have heartburn before she went to bed but did not take anything. She has also had diarrhea. Recommend evaluation in ED due to SOB and difficulty catching breath. Patient states understanding.

## 2024-02-13 NOTE — TELEPHONE ENCOUNTER
Checked in with pt. She had paged the midwife on night call to state that she was having shortness of breath and was advised to go to ED.  Pt now states that at that time she opened the window and was able to lie back down and rest. She no longer has the symptoms and feels fine. Thinks she was having an anxiety attack.   Advised pt to page again if symptoms return. She voiced understanding and agreed with plan. All questions answered.

## 2024-02-19 ENCOUNTER — OFFICE VISIT (OUTPATIENT)
Dept: HEMATOLOGY/ONCOLOGY | Facility: HOSPITAL | Age: 24
End: 2024-02-19
Attending: ADVANCED PRACTICE MIDWIFE
Payer: MEDICAID

## 2024-02-19 VITALS
SYSTOLIC BLOOD PRESSURE: 109 MMHG | DIASTOLIC BLOOD PRESSURE: 60 MMHG | WEIGHT: 157 LBS | TEMPERATURE: 99 F | OXYGEN SATURATION: 100 % | RESPIRATION RATE: 16 BRPM | HEART RATE: 98 BPM

## 2024-02-19 DIAGNOSIS — O99.019 ANEMIA IN PREG-UNSPEC: Primary | ICD-10-CM

## 2024-02-19 PROCEDURE — 96366 THER/PROPH/DIAG IV INF ADDON: CPT

## 2024-02-19 PROCEDURE — 96365 THER/PROPH/DIAG IV INF INIT: CPT

## 2024-02-19 NOTE — PROGRESS NOTES
Pt here for venofer 300mg .    Ordering MD: Evan HILLS  Order Exp: one of three     Pt tolerated infusion without difficulty or complaint. Reviewed next apt date/time: yes      Education Record  Learner:  Patient  Disease / Diagnosis: PAULINE in pregnancy  Barriers / Limitations:  None  Method:  Discussion  General Topics:  Plan of care reviewed; potential adverse reaction to and s/s of , potential side effects of and how to manage, length of infusion time. All questions answered to the best of my ability.  Outcome:  Shows understanding, post infusion vs wnl, discharged stable.

## 2024-02-19 NOTE — PROGRESS NOTES
Carola Fox is a 23 year old  pt at 35w1d here for LEWIS  She is feeling well. Reports continuous mild lower back pain and pelvic pressure worse when walking.     ROS:  Denies cramping, bleeding, leaking of fluid.  Fetus is active.    Vitals:    24 1507   BP: 120/87   Pulse: 112   Weight: 159 lb (72.1 kg)      See flowsheet  TW lbs  Tdap declined   - hgb 10.9 Ferritin 3.2  Received IV Fe yesterday, 2 more infusions scheduled , 3/7.     Assessment/Plan:  IUP at 35w1d, recommendation to use pregnancy support belt,  Anemia improving     Reviewed:   labor precautions  Kick counts  Danger Signs  RTC 2 wk(s), GBS next visit    Pt verbalized understanding.  All questions answered.  No barriers to learning identified    AZAEL Huff under direct supervision of Agueda Garcia CNM  I personally performed the patient's exam and medical decision making on this date of service.  I was physically present in the room for the performance of the E/M service.  I have reviewed the JEANA student's documentation and findings including history, Exam, and Medical Decision Making, edited the document for accuracy and verify that it represents the clinical findings and services performed.    Agueda Garcia CNM

## 2024-02-20 ENCOUNTER — ROUTINE PRENATAL (OUTPATIENT)
Dept: OBGYN CLINIC | Facility: CLINIC | Age: 24
End: 2024-02-20

## 2024-02-20 VITALS — HEART RATE: 112 BPM | SYSTOLIC BLOOD PRESSURE: 120 MMHG | WEIGHT: 159 LBS | DIASTOLIC BLOOD PRESSURE: 87 MMHG

## 2024-02-20 DIAGNOSIS — Z34.03 ENCOUNTER FOR SUPERVISION OF NORMAL FIRST PREGNANCY IN THIRD TRIMESTER (HCC): Primary | ICD-10-CM

## 2024-02-20 PROCEDURE — 99212 OFFICE O/P EST SF 10 MIN: CPT | Performed by: ADVANCED PRACTICE MIDWIFE

## 2024-02-29 ENCOUNTER — OFFICE VISIT (OUTPATIENT)
Dept: HEMATOLOGY/ONCOLOGY | Facility: HOSPITAL | Age: 24
End: 2024-02-29
Attending: ADVANCED PRACTICE MIDWIFE
Payer: MEDICAID

## 2024-02-29 VITALS
HEART RATE: 98 BPM | OXYGEN SATURATION: 100 % | TEMPERATURE: 98 F | DIASTOLIC BLOOD PRESSURE: 83 MMHG | RESPIRATION RATE: 16 BRPM | SYSTOLIC BLOOD PRESSURE: 113 MMHG

## 2024-02-29 DIAGNOSIS — O99.019 ANEMIA IN PREG-UNSPEC (HCC): Primary | ICD-10-CM

## 2024-02-29 PROCEDURE — 96365 THER/PROPH/DIAG IV INF INIT: CPT

## 2024-02-29 NOTE — PROGRESS NOTES
Pt here for Venofer 300. Patient arrived to unit, vitals stable. PIV started to L AC, patient reported dizziness, stating \"I'm going to pass out.\" and dry heaving at site of blood. Had patient sip on her juice she brought and take deep breaths. Patient recovered with no issue. IV Venofer infused, patient tolerated. PIV removed. Pt denies any issues or concerns.      Ordering MD: Evan     Pt tolerated infusion without difficulty or complaint. Reviewed next apt date/time: 3/7 (Venofer 3/3)      Education Record  Learner:  Patient  Disease / Diagnosis: Anemia in pregnancy   Barriers / Limitations:  None  Method:  Discussion  General Topics:  Medication and Plan of care reviewed  Outcome:  Shows understanding

## 2024-03-03 ENCOUNTER — HOSPITAL ENCOUNTER (OUTPATIENT)
Facility: HOSPITAL | Age: 24
Discharge: HOME | End: 2024-03-03
Attending: ADVANCED PRACTICE MIDWIFE | Admitting: OBSTETRICS & GYNECOLOGY
Payer: MEDICAID

## 2024-03-03 ENCOUNTER — MOBILE ENCOUNTER (OUTPATIENT)
Dept: OBGYN CLINIC | Facility: CLINIC | Age: 24
End: 2024-03-03

## 2024-03-03 VITALS
DIASTOLIC BLOOD PRESSURE: 85 MMHG | HEART RATE: 95 BPM | TEMPERATURE: 98 F | RESPIRATION RATE: 16 BRPM | SYSTOLIC BLOOD PRESSURE: 125 MMHG

## 2024-03-03 PROBLEM — O36.8130 DECREASED FETAL MOVEMENTS IN THIRD TRIMESTER (HCC): Status: ACTIVE | Noted: 2024-03-03

## 2024-03-03 PROCEDURE — 59025 FETAL NON-STRESS TEST: CPT | Performed by: ADVANCED PRACTICE MIDWIFE

## 2024-03-03 NOTE — PROGRESS NOTES
Pt paged to report that she thinks she is having contractions. They feel sharp by her pubic bone and pelvis and they are getting no longer. They are coming about every 5 minutes. Denies leaking or vaginal bleeding. However, she states that the baby is not moving now. It was earlier. This is usually its active time. Advise pt to come to triage for evaluation. She voiced understanding and agreed. Triage nurse notified.

## 2024-03-04 LAB — GROUP B STREP BY PCR FOR PCR OVT: POSITIVE

## 2024-03-04 NOTE — TRIAGE
[facility]  part of [facility]      Triage Note    Carola Fox Patient Status:  Outpatient    2000 MRN [id number]   Location [facility] Attending Agueda Garcia CNM   Hosp Day # 0 PCP Nunu Santoro MD      Para:   Estimated Date of Delivery: 3/25/24  Gestation: 36w6d    Chief Complaint    R/o Labor         Allergies:  No Known Allergies    Orders Placed This Encounter   Procedures    Group B Strep PCR       Lab Results   Component Value Date    WBC 8.4 2024    HGB 10.9 (L) 2024    HCT 32.9 (L) 2024    .0 2024    CREATSERUM 0.83 2022    BUN 6 (L) 2022     2022    K 3.5 2022     2022    CO2 27.0 2022    GLU 87 2022    CA 9.4 2022    ALB 4.0 2022    ALKPHO 58 2022    BILT 1.0 2022    TP 7.6 2022    AST 11 (L) 2022    ALT 18 2022       Clinitek UA  Lab Results   Component Value Date    GLUUR Negative 2022    SPECGRAVITY 1.025 2022    URINECUL No Growth at 18-24 hrs. 2023       UA  Lab Results   Component Value Date    COLORUR Marj (A) 2022    CLARITY Cloudy (A) 2022    SPECGRAVITY 1.025 2022    PROUR Negative 2022    GLUUR Negative 2022    KETUR Negative 2022    BILUR Negative 2022    BLOODURINE Large (A) 2022    NITRITE Negative 2022    UROBILINOGEN 0.2 2022    LEUUR Trace (A) 2022       Vitals:    24 1815   BP: 125/85   BP Location: Left arm   Pulse: 95   Resp: 16   Temp: 97.6 °F (36.4 °C)   TempSrc: Axillary       NST  Variability: Moderate           Accelerations: Yes           Decelerations: None            Baseline: 140 BPM           Uterine Irritability: Yes           Contractions: Irregular                    Contraction Frequency: Irregular with irritability                   Acoustic Stimulator: No           Nonstress  Test Interpretation: Reactive           Nonstress Test Second Interpretation: Reactive          FHR Category: Category I             Additional Comments       Chief Complaint   Patient presents with    R/o Labor     Patient states feeling lower abd and pubic bone pressure/sharp pain come and go for 40 mins around 1600. Patient states the pain has subsided and states +FM     SVE done by previous triage RN: FT/30/-3. GBS sent. IVF bolus administered. 2H repeat SVE done by Jose ARAYAM: 2/80/-2. Case reviewed and discharge order received. Patient comfortable going home and waiting for stronger contractions. Written and verbal discharge instructions provided including fetal kick counts, s/s of labor, and s/s of preeclampsia. Patient has next appointment March 7th. Patient verbalizes understanding. Patient ambulating off unit in stable condition with support person x2. Steady gait observed.     Neelam GOODSON RN  3/3/2024 8:39 PM    Addendum:  Agree with above assessment and plan.  SVE 2cm/80%/ -2/ vtx.  Pt appears comfortable with Ucs.  Will call back with regular painful Ucs, SROM, vag bleeding or decreased FM.  All questions answered, pt verbalized understanding.  Agueda Garcia CNM

## 2024-03-04 NOTE — DISCHARGE INSTRUCTIONS
Call your OB provider if you experience fluid leaking from your vagina, decrease in fetal movement, vaginal or rectal pressure, uterine contractions 10 minutes or closer for 1 to 2 hours increasing in intensity and frequency , vaginal bleeding, a temperature greater than 100 F.

## 2024-03-04 NOTE — PROGRESS NOTES
Pt is a 23 year old female admitted to TR1/TR1-A.     Chief Complaint   Patient presents with    R/o Labor     Patient states feeling lower abd and pubic bone pressure/sharp pain come and go for 40 mins around 1600. Patient states the pain has subsided and states +FM      Pt is  36w6d intra-uterine pregnancy.  History obtained, consents signed. Oriented to room, staff, and plan of care.

## 2024-03-07 ENCOUNTER — ROUTINE PRENATAL (OUTPATIENT)
Dept: OBGYN CLINIC | Facility: CLINIC | Age: 24
End: 2024-03-07

## 2024-03-07 ENCOUNTER — OFFICE VISIT (OUTPATIENT)
Dept: HEMATOLOGY/ONCOLOGY | Facility: HOSPITAL | Age: 24
End: 2024-03-07
Attending: ADVANCED PRACTICE MIDWIFE
Payer: MEDICAID

## 2024-03-07 VITALS
HEART RATE: 98 BPM | TEMPERATURE: 98 F | RESPIRATION RATE: 16 BRPM | OXYGEN SATURATION: 100 % | SYSTOLIC BLOOD PRESSURE: 118 MMHG | DIASTOLIC BLOOD PRESSURE: 77 MMHG

## 2024-03-07 VITALS — SYSTOLIC BLOOD PRESSURE: 118 MMHG | DIASTOLIC BLOOD PRESSURE: 81 MMHG | HEART RATE: 85 BPM | WEIGHT: 162 LBS

## 2024-03-07 DIAGNOSIS — O99.019 ANEMIA IN PREG-UNSPEC (HCC): Primary | ICD-10-CM

## 2024-03-07 DIAGNOSIS — Z34.03 ENCOUNTER FOR SUPERVISION OF NORMAL FIRST PREGNANCY IN THIRD TRIMESTER (HCC): Primary | ICD-10-CM

## 2024-03-07 PROCEDURE — 99213 OFFICE O/P EST LOW 20 MIN: CPT | Performed by: ADVANCED PRACTICE MIDWIFE

## 2024-03-07 PROCEDURE — 96365 THER/PROPH/DIAG IV INF INIT: CPT

## 2024-03-07 NOTE — PROGRESS NOTES
Pt here for Venofer 300 3/3 . Pt denies any issues or concerns.      Ordering MD: Evan  Order Exp: 3 of 3 doses     Pt tolerated infusion without difficulty or complaint.     Reviewed next apt date/time: N/a- pt seeing her MD today.       Education Record  Learner:  Patient and Family Member  Disease / Diagnosis: Anemia in pregnancy  Barriers / Limitations:  None  Method:  Discussion  General Topics:  Medication, Procedure, Side effects and symptom management, and Plan of care reviewed  Outcome:  Shows understanding

## 2024-03-07 NOTE — PATIENT INSTRUCTIONS
Understanding Preeclampsia  Preeclampsia is high blood pressure (hypertension) that happens during pregnancy. It often shows up around the 20th week of pregnancy. It often goes back to normal by the 12th week after you give birth. It can lead to serious health risks for you and your baby. During your pregnancy, your healthcare provider will watch your blood pressure.    Symptoms  A common symptom of preeclampsia is high blood pressure. Other symptoms may include:  Rapid weight gain  Protein in your urine  Headache  Belly (abdominal) pain on your right side  Vision problems. These include flashes or spots.  Swelling (edema) in your face or hands. This also often happens near the end of normal pregnancies, even without preeclampsia.  Tests you may have  Your healthcare provider will want to check your blood pressure throughout your pregnancy. If your blood pressure is high, you may have the following tests:  Urine tests to look for protein  Blood tests to confirm preeclampsia  Fetal monitoring to make sure that your baby is healthy  Treating preeclampsia  You may need to take a daily low dose of aspirin if you are at risk for preeclampsia. Preeclampsia almost always ends soon after you give birth. Until then, your healthcare provider can help manage your condition. If your symptoms are mild, you may need activity limits at home, including bed rest and no heavy lifting. If your symptoms are severe, you will stay in the hospital. Hospital treatment includes:  Activity limits to help control blood pressure. This means no heavy lifting and 8 hours per day lying down with the feet up.  Magnesium IV (intravenous) drip during labor to prevent seizures  Induced labor or surgical delivery by  section. Delivery is considered the cure for preeclampsia.  When to call your healthcare provider  Call your healthcare provider if swelling, weight gain, or other symptoms come on quickly or are severe. Some cases of  preeclampsia are more severe than others. Your symptoms also may change or get worse as you get closer to your due date.  Who’s at risk?  No one knows what causes preeclampsia. Preeclampsia can happen in any pregnant woman. But it is more common in first-time pregnancies. Things that increase the risk include:  Previous pregnancies. You are at risk if you had preeclampsia, intrauterine growth retardation (IUGR),  birth, placental abruption, or fetal death in a past pregnancy.  Health history of mother. You are at risk if you have diabetes, high blood pressure, obesity, kidney disease, autoimmune disease such as lupus, or a family history of preeclampsia.  Current pregnancy. You are at risk if this is your first pregnancy, or if you have multiple fetuses, are younger than age 18 or older than 40, or used in vitro fertilization.  Race. You are at risk if you are black.  Dangers of preeclampsia  If not treated, preeclampsia can cause problems for you and your baby. The placenta is the organ that nourishes your baby. It may tear away from the uterine wall. This can put the baby at risk for health problems (fetal distress) and premature birth. Preeclampsia can also cause these health problems:  Kidney failure or other organ damage  Seizures  Stroke  Once you give birth  In most cases, preeclampsia goes away on its own soon after you give birth. This is often by the 12th week after you give deliver. Within days of delivery, your blood pressure, swelling, and other symptoms should get better. For some women, problems from preeclampsia can continue after delivery.  Postpartum preeclampsia that develops within the first 48 hours after delivery is rare. Another type of postpartum preeclampsia that develops more than 48 hours after delivery is called late-onset preeclampsia. It is also rare. Contact your healthcare provider right away if you have symptoms of preeclampsia after you deliver.  [employer] last reviewed this  educational content on 12/1/2019 © 2000-2020 [employer]. [address] [place] [address]. All rights reserved. This information is not intended as a substitute for professional medical care. Always follow your healthcare professional's instructions.        Kick Counts    It’s normal to worry about your baby’s health. One way you can know your baby’s doing well is to record the baby’s movements once a day. This is called a kick count. Remember to take your kick count records to all your appointments with your healthcare provider.  How to count kicks  Time how long it takes you to feel 10 kicks, flutters, swishes, or rolls. Ideally, you want to feel at least 10 movements within 2 hours. You will likely feel 10 movements in less time than that.  Starting at 28 weeks, count your baby's movements daily. Follow your healthcare provider's instructions for kick counting. Here are tips for counting kicks:  Choose a time when the baby is active, such as after a meal.   Sit comfortably or lie on your side.   The first time the baby moves, write down the time.   Count each movement until the baby has moved 10 times. This can take from 20 minutes to 2 hours.   If you have not felt 10 kicks by the end of the second hour, wait a few hours. Then try again.  Try to do it at the same time each day.  When to call your healthcare provider  Call your healthcare provider right away if:  You do a couple sets of kick counts during the day and your baby moves fewer than 10 times in 2 hours  Your baby moves much less often than on the days before.  You have not felt your baby move all day.  [employer] last reviewed this educational content on 12/1/2017 © 2000-2020 [employer]. [address] [place] [address]. All rights reserved. This information is not intended as a substitute for professional medical care. Always follow your healthcare professional's instructions.        Recognizing  Labor    The beginning of labor is the beginning of birth. You’ll start to feel strong contractions. That’s when the muscles of your uterus tighten up to help push your baby out during birth.  Yes, labor has probably started   Signs of labor include:  Your contractions are getting stronger and more painful instead of weaker. You’ll probably feel them throughout your whole uterus.  Your contractions are regular. This means that you feel them about every 5 to 10 minutes. And they are getting closer together.  You have pink-colored or blood-streaked fluid from your vagina.  You feel that the baby has \"dropped\" lower in your pelvis   Your water breaks. It may be a gush or a slow trickle of clear fluid from your vagina.  No, it’s probably not real labor   Signs of false labor include:  Your contractions aren’t regular or strong.  You feel the contractions only in your lower uterus.  Your contractions go away when you walk or change position.  Your contractions go away after drinking fluids.  When to call your healthcare provider  Call your healthcare provider or clinic right away if you notice any of these signs:  Fluid from your vagina, with or without contractions.  Bleeding heavy enough that you need a sanitary pad.  You don’t feel your baby moving as much as before.     NOTE: Contractions are timed by both of these measures:  The length of each contraction from its start to its finish.  How far apart the contractions are--the time between the start of one contraction and the start of the next contraction.   [employer] last reviewed this educational content on 10/1/2017  © 4704-8074 [employer]. [address] [place] [address], [place] [address]. All rights reserved. This information is not intended as a substitute for professional medical care. Always follow your healthcare professional's instructions.        Stages of Labor    Labor has 3 stages. Your healthcare provider may talk about the progress of your labor  with certain words. One of these is your baby’s position. Another is your baby’s station. And the effacement and dilation of your cervix will be noted. Read below to learn about these terms and the 3 stages of labor.  Your baby moves into position  Position is your baby’s placement in your uterus. Your baby may be facing left or right. He or she may be head first or feet first. Station refers to how far your baby has moved down into your pelvic cavity.  First stage of labor  During the first stage of labor, contractions of the uterus help your cervix thin (efface). They also help it widen (dilate). This will help your baby pass through the birth canal (vagina). At first your contractions will not come that often or last that long. But as time passes, they will come more often, they may be more painful, and they will last longer. They will last about 30 to 60 seconds each. The first stage of labor lasts until the cervix is fully dilated.  Second stage of labor  When your cervix is fully dilated, the second stage of labor begins. In this stage, you will have stronger contractions of your uterus that will help your baby move down the birth canal. They may happen every 2 to 5 minutes. They may last from 45 to 90 seconds each. Your healthcare provider will ask you to push with each contraction. Try to rest between the contractions if you can. Your baby is delivered at the end of this stage of labor.  Third stage of labor  The third stage of labor comes after your baby is born. This is when the afterbirth (placenta) comes out of your uterus. Your uterus will continue to contract. But the contractions are much milder than before.  [employer] last reviewed this educational content on 10/1/2017  © 0376-0124 [employer]. [address] [place] [address], [place] [address]. All rights reserved. This information is not intended as a substitute for professional medical care. Always follow your healthcare professional's  instructions.

## 2024-03-07 NOTE — PROGRESS NOTES
Carola is a 23 year old , at 37w3d, here for her return OB visit.  Currently, she is feeling well. Denies contractions, bleeding, and leakage of fluid. Endorses active fetus.    Vital signs and weight reviewed  See flowsheets    Today's Assessment/Plan:   Natural methods for cervical ripening reviewed  Recognizing labor reviewed  3.  Birth plan reviewed:    Support: Boyfriend  Pain management: Unmedicated, breathing   Vitamin K: Wants to research  Erythromycin ointment: Yes  Placenta: Discard  Circumcision: n/a  Peds: Pt thinking [facility] in [place] but will look in to it  Feeding method: Breast  Housing/car seat: Has  Contraception: POPs    Next visit: Follow up OB: 1 week    Reviewed:   3rd trimester precautions and expectations  Labor precautions  Kick counts  Danger signs  Prenatal visit schedule    Pt verbalized understanding. All questions answered. No barriers to learning identified    My total time spent caring for the patient on the day of the encounter:  20 minutes, which included: chart review, exam, care coordination, charting, and counseling as per above.

## 2024-03-13 ENCOUNTER — APPOINTMENT (OUTPATIENT)
Dept: ULTRASOUND IMAGING | Facility: HOSPITAL | Age: 24
End: 2024-03-13
Attending: ADVANCED PRACTICE MIDWIFE
Payer: MEDICAID

## 2024-03-13 ENCOUNTER — HOSPITAL ENCOUNTER (OUTPATIENT)
Facility: HOSPITAL | Age: 24
Setting detail: OBSERVATION
Discharge: HOME | End: 2024-03-14
Attending: ADVANCED PRACTICE MIDWIFE | Admitting: OBSTETRICS & GYNECOLOGY
Payer: MEDICAID

## 2024-03-13 DIAGNOSIS — O36.8130 DECREASED FETAL MOVEMENTS IN THIRD TRIMESTER, SINGLE OR UNSPECIFIED FETUS (HCC): Primary | ICD-10-CM

## 2024-03-13 PROCEDURE — 59025 FETAL NON-STRESS TEST: CPT

## 2024-03-13 PROCEDURE — 76819 FETAL BIOPHYS PROFIL W/O NST: CPT | Performed by: ADVANCED PRACTICE MIDWIFE

## 2024-03-13 PROCEDURE — 76818 FETAL BIOPHYS PROFILE W/NST: CPT | Performed by: ADVANCED PRACTICE MIDWIFE

## 2024-03-13 PROCEDURE — 99214 OFFICE O/P EST MOD 30 MIN: CPT

## 2024-03-14 ENCOUNTER — HOSPITAL ENCOUNTER (INPATIENT)
Dept: PERINATAL CARE | Facility: HOSPITAL | Age: 24
Discharge: HOME | End: 2024-03-14
Attending: ADVANCED PRACTICE MIDWIFE
Payer: MEDICAID

## 2024-03-14 VITALS
HEART RATE: 76 BPM | WEIGHT: 163 LBS | TEMPERATURE: 98 F | SYSTOLIC BLOOD PRESSURE: 117 MMHG | DIASTOLIC BLOOD PRESSURE: 72 MMHG | RESPIRATION RATE: 16 BRPM

## 2024-03-14 PROCEDURE — 76815 OB US LIMITED FETUS(S): CPT

## 2024-03-14 PROCEDURE — 76816 OB US FOLLOW-UP PER FETUS: CPT

## 2024-03-14 PROCEDURE — 76819 FETAL BIOPHYS PROFIL W/O NST: CPT | Performed by: ADVANCED PRACTICE MIDWIFE

## 2024-03-14 RX ORDER — ACETAMINOPHEN 500 MG
500 TABLET ORAL EVERY 6 HOURS PRN
Status: DISCONTINUED | OUTPATIENT
Start: 2024-03-14 | End: 2024-03-14

## 2024-03-14 RX ORDER — ACETAMINOPHEN 500 MG
1000 TABLET ORAL EVERY 6 HOURS PRN
Status: DISCONTINUED | OUTPATIENT
Start: 2024-03-14 | End: 2024-03-14

## 2024-03-14 NOTE — PROGRESS NOTES
Pt. Evaluated by Dr. Hernandez.   BPP 8/8; GERMAINE 9.3cm.  DENNIS Crisostomo in to discuss plan of care with pt.  Repeat NST scheduled for 3/16/24, and IOL for 3/18/24 @ 0800    Discharged home  Ambulatory and in stable condition with written and verbal labor, and preeclampsia instructions. Patient verbalizes understanding of information given.

## 2024-03-14 NOTE — H&P
[facility]  part of [facility]    History & Physical    Carola Fox Patient Status:  Inpatient    2000 MRN [id number]   Location [facility] Attending Rosita Bhatt CNM   Hosp Day # 0 Admitting Suhail Loving MD     Date of Admission:  3/13/2024      HPI:   Carola Fox is 23 year old and  with current gestational age of 38w3d and estimated due date of 3/25/2024, by Last Menstrual Period consistent with 6 week ultrasound who presented with c/o of cramping, decreased FM and report of wetness in underwear the last couple days when wakes up. No gushes or leaking during the day.     Carola is being admitted for observation.    Her current obstetrical history is significant for anemia.    Patient reports no bleeding and mild cramping, decreased fetal movement  .     Fetal Movement: decreased.     History   Obstetric History:   OB History    Para Term  AB Living   2       1     SAB IAB Ectopic Multiple Live Births   1              # Outcome Date GA Lbr Babak/2nd Weight Sex Delivery Anes PTL Lv   2 Current            1 SAB  8w0d             Birth Comments: No D&C     Past Medical History:   Past Medical History:   Diagnosis Date    Anemia     Spontaneous miscarriage (HCC)      Past Social History: No past surgical history on file.  Family History:   Family History   Problem Relation Age of Onset    Stroke Maternal Grandmother     Cancer Paternal Grandmother         ovarian cancer?    Heart Disease Neg      Social History:   Social History     Tobacco Use    Smoking status: Never    Smokeless tobacco: Not on file    Tobacco comments:     No smokers in home.    Substance Use Topics    Alcohol use: Not Currently        Allergies/Medications:   Allergies:   No Known Allergies  Medications:  Medications Prior to Admission   Medication Sig Dispense Refill Last Dose    Prenatal Vit-Fe Sulfate-FA-DHA (PRENATAL VITAMIN/MIN +DHA) 27-0.8-200 MG Oral  Cap Take 1 capsule by mouth daily. 30 capsule 11 3/13/2024       Review of Systems:   Constitutional: denies fever, aches, chills  HEENT: denies visual changes  Skin: denies any unusual skin lesions or itching  Lungs: denies shortness of breath  Cardiovascular: denies chest pain  GI: denies abdominal pain,denies heartburn, denies constipation or diarrhea  : denies dysuria, pelvic pain, vaginal discharge or bleeding  Musculoskeletal: denies back or joint pain  Neuro denies headaches or dizziness  Psych: denies depression or anxiety    Physical Exam:   Temp:  [97.5 °F (36.4 °C)-98.4 °F (36.9 °C)] 97.8 °F (36.6 °C)  Pulse:  [63-95] 95  Resp:  [16] 16  BP: (116-136)/(69-90) 116/75    Constitutional: alert, appears stated age, and cooperative  Skin: no lesions or erythema  Respiratory: clear, unlabored  Cardiac: regular rate and rhythm   Abdomen: soft, non-tender, EFW 6 1/2 #, presentation cephalic, uterine contractions occasional  Fetal Surveillance:  125 BPM; Fetal heart variability: moderate  Fetal Heart Rate decelerations: none  Fetal Heart Rate accelerations: yes  Pelvis: Gynecoid  External Genitalia:  No lesions  Sterile vaginal exam: deferred  2/50/-2 cephalic per RN in triage . Spec exam done: neg nitrazine, no pooling. Negative ferning  Extremities: extremities normal, atraumatic, no cyanosis or edema  Musculoskeletal: steady gait    Psych:  Appropriate affect and speech    Results:     Prenatal Results       Initial       Test Value Reference Range Date Time    Blood Type (ABO Group)  O   09/14/23 1335    Rh Factor  Positive   09/14/23 1335    Antibody Screen (Required questions in OE to answer)  Negative   09/14/23 1335    HCT  38.5 % 35.0 - 48.0 09/14/23 1335    HGB  13.3 g/dL 12.0 - 16.0 09/14/23 1335    MCV  85.9 fL 80.0 - 100.0 09/14/23 1335    Platelets  196.0 10(3)uL 150.0 - 450.0 09/14/23 1335    Rubella  Positive  Positive 09/14/23 1335    TREP  Nonreactive  Nonreactive  09/14/23 1335    RPR ([facility])         Urine Culture  No Growth at 18-24 hrs.   23 1335    Hepatitis B  Nonreactive  Nonreactive  23 1335    HIV Combo  Non-Reactive  Non-Reactive 23 1335    HCV  Nonreactive  Nonreactive  23 1335              Optional Initial Labs       Test Value Reference Range Date Time    TSH        Pap Smear        HPV        GC DNA        Chlamydia DNA        GTT 1 Hr        Glucose Fasting        Glucose 1 Hr        Glucose 2 Hr        Glucose 3 Hr        HgB A1c        Vitamin D                  8-20 Weeks       Test Value Reference Range Date Time    1st Trimester Aneuploidy Risk Assessment        Quad - Down Screen Risk Estimate - prior to 18        Quad - Down Maternal Age Risk - prior to 18        Quad - Trisomy 18 screen Risk Estimate - prior to 18        AFP Spina Bifida (Required questions in OE to answer )        QUAD/AFP - Interpretation        Free Fetal DNA  ^ low risk   10/02/23     Genetic testing        Genetic testing        Genetic testing        GC DNA        Chlamydia DNA                  24-28 Weeks       Test Value Reference Range Date Time    HCT  32.2 % 35.0 - 48.0 24 1357    HGB  10.6 g/dL 12.0 - 16.0 24 1357    Platelets  161.0 10(3)uL 150.0 - 450.0 24 1357    GTT 1 Hr  126 mg/dL See comment 24 1357    Glucose Fasting        Glucose 1 Hr        Glucose 2 Hr        Glucose 3 Hr        TSH         Profile        Urine Culture        GC DNA        Chlamydia DNA                  35-37 Weeks       Test Value Reference Range Date Time    HCT  32.9 % 35.0 - 48.0 24 1149    HGB  10.9 g/dL 12.0 - 16.0 24 1149    Platelets  165.0 10(3)uL 150.0 - 450.0 24 1149    TREP  Nonreactive  Nonreactive  24 1357    RPR ([facility])        Genital Group B Culture  Positive  Negative 24 1901    TSH        Urine Culture        HIV Combo  Non-Reactive  Non-Reactive 24 1357    GC DNA        Chlamydia DNA                   Optional Labs       Test Value Reference Range Date Time    HgB A1c        HGB Electrophoresis  (See Report)    23 1335    Varicella Zoster  1,950.00  >165.00 23 1335    Cystic Fibrosis-Old         Cystic Fibrosis[32] (Required questions in OE to answer)        Cystic Fibrosis[165] (Required questions in OE to answer)        Cystic Fibrosis[165] (Required questions in OE to answer)        Cystic Fibrosis[165] (Required questions in OE to answer)        Sickle Cell        24Hr Urine Protein        24Hr Urine Creatinine        Parvo B19 IgM        Parvo B19 IgG                  Legend    ^: Historical                            Reviewed all prenatal ultrasounds    BPP done , GERMAINE 9        Assessment/Plan:   Carola is 23 year old and  with current gestational age of 38w3d and estimated due date of 3/25/2024, by Last Menstrual Period consistent with 6 week ultrasound    Not in labor.  Category 1 FHR tracing  Obstetrical history significant for anemia.    Admission problem(s):    Pregnancy (HCC)        Anemia in preg-unspec (HCC)  -S/P IV Iron. Last Hgb 10.9      Decreased fetal movements in third trimester (HCC)  -Reactive Category 1 tracing  -BPP   -Admit for observation. As FHT are reassuring at this time and BPP results do not seem consistent with EFM, and pt only 38 wks, will admit and have MFM repeat BPP in am. If remains low will proceed with IOL/delivery  -Pt and partner agree with plan          Risks, benefits, alternatives and possible complications have been discussed in detail with the patient.   Pre-admission, admission, and post admission procedures and expectations were discussed in detail.    All questions answered, all appropriate consents will be signed at the Hospital. Admission is planned for today.   Expectant management. and CEFM .    Rosita Bhatt CNM  3/14/2024  6:22 AM

## 2024-03-14 NOTE — PROGRESS NOTES
[facility]  part of [facility]    OB/GYNE Progress Note      Carola Fox Patient Status:  Inpatient    2000 MRN [id number]   Location [facility] Attending Perlita Crisostomo CNM   Hosp Day # 0 PCP Nunu Santoro MD     Assessment & Plan:     Pregnancy (HCC)        Anemia in preg-unspec (HCC)        Decreased fetal movements in third trimester (HCC)   Resolved   BPP 8/8; GERMAINE 9.3cm.     Stable for discharge home  Repeat NST scheduled for 3/16/24  IOL for 3/18/24 @ 0800             Discussed with: consultant Dr HernandezSOHAIL     nurse     patient     Plan discussed with patient who verbalizes understanding and agreement.    Subjective:     Review of Systems:    Constitutional: feeling well, pain free, good appetite, and ambulating in room  Antepartum: no complaints and reports increased FM  Psychiatric: calm and engaged  Denies contractions    Objective:   Vital signs in last 24 hours:  Temp:  [97.5 °F (36.4 °C)-98.4 °F (36.9 °C)] 98.4 °F (36.9 °C)  Pulse:  [63-95] 76  Resp:  [16] 16  BP: (116-136)/(69-90) 117/72    Input/Output:    Intake/Output Summary (Last 24 hours) at 3/14/2024 1110  Last data filed at 3/14/2024 0150  Gross per 24 hour   Intake 520 ml   Output --   Net 520 ml       Weight (Last 6):  Wt Readings from Last 6 Encounters:   24 163 lb (73.9 kg)   24 162 lb (73.5 kg)   24 159 lb (72.1 kg)   24 157 lb (71.2 kg)   24 156 lb (70.8 kg)   24 151 lb (68.5 kg)     There is no height or weight on file to calculate BMI.       Fetal Surveillance:   BPM; Fetal heart variability: moderate  Fetal Heart Rate decelerations: none  Fetal Heart Rate accelerations: yes  Uterine contractions: none   Exam:   Constitutional: comfortable and appears rested  Gravid NT   Pelvic deferred  VS reviewed  Phone consult with Dr Hernandez.  Plan if further decreased FM pt to be induced Pt requesting  eIOL at 39 week which is appropriate.    DVT Risk  Score: Low      Results:   Lab Results   Component Value Date    TREPONEMALAB Nonreactive 01/09/2024    ABO O 09/14/2023    RH Positive 09/14/2023    WBC 8.4 02/08/2024    HGB 10.9 (L) 02/08/2024    HCT 32.9 (L) 02/08/2024    .0 02/08/2024    CREATSERUM 0.83 02/08/2022    BUN 6 (L) 02/08/2022     02/08/2022    K 3.5 02/08/2022     02/08/2022    CO2 27.0 02/08/2022    GLU 87 02/08/2022    CA 9.4 02/08/2022    ALB 4.0 02/08/2022    ALKPHO 58 02/08/2022    BILT 1.0 02/08/2022    TP 7.6 02/08/2022    AST 11 (L) 02/08/2022    ALT 18 02/08/2022       Lab Results   Component Value Date    COLORUR Marj (A) 02/08/2022    CLARITY Cloudy (A) 02/08/2022    SPECGRAVITY 1.025 02/08/2022    PROUR Negative 02/08/2022    GLUUR Negative 02/08/2022    KETUR Negative 02/08/2022    BILUR Negative 02/08/2022    BLOODURINE Large (A) 02/08/2022    NITRITE Negative 02/08/2022    UROBILINOGEN 0.2 02/08/2022    LEUUR Trace (A) 02/08/2022       US PREG BIOPHYSICAL WO STRESS TEST (CPT=76819)    Result Date: 3/14/2024  CONCLUSION:   Biophysical score 4/8.  Single live intrauterine pregnancy in cephalic position.  Preliminary report was submitted by the [employer] teleradiologist and there are no significant discrepancies.  elm-remote    Dictated by (CST): Dionte Jimenez MD on 3/14/2024 at 8:07 AM     Finalized by (CST): Dointe Jimenez MD on 3/14/2024 at 8:12 AM            MFM consult and ultrasound this AM  Ultrasound Findings:  Single IUP in cephalic presentation.    Placenta is posterior, high.   A 3 vessel cord is noted.  Cardiac activity is present at 133 bpm  EFW 2925 g ( 6 lb 7 oz); 21%.    MVP is 3.9 cm . GERMAINE 9.3 cm  BPP is 8/8.         Impression:  Wilhelm intrauterine pregnancy in cephalic presentation  Measurements are consistent with dates  Biophysical profile score 8/8; GERMAINE 9.3 cm    Perlita Crisostomo CNM  3/14/2024  11:10 AM

## 2024-03-14 NOTE — PROGRESS NOTES
Pt is a 23 year old female admitted to TR1/TR1-A.     Chief Complaint   Patient presents with    R/o Labor     Pt reports occasional tightening of the stomach for the last 2-3 days.     R/o Rom     Pt states waking up the last 2-3 days and feeling wet.     Decreased Fetal Movement     Pt reports decrease in baby's movements the last 2-3 days.       Pt is  38w2d intra-uterine pregnancy.  History obtained, consents signed. Oriented to room, staff, and plan of care.

## 2024-03-14 NOTE — PAYOR COMM NOTE
--------------OBSERVATION STATUS        DISCHARGE REVIEW    Payor: [facility]  Subscriber #:  [id number]  Authorization Number: [id number]    Admit date: N/A  Admit time:  N/A  Discharge Date: 3/14/2024 11:27 AM     Admitting Physician: Suhail Loving MD  Attending Physician:  No att. providers found  Primary Care Physician: Nunu Santoro MD

## 2024-03-14 NOTE — PROGRESS NOTES
Pt. Returned for [facility] ultrasound.   BPP 8/8.   Fetal monitor reapplied

## 2024-03-14 NOTE — CONSULTS
[facility]  Maternal-Fetal Medicine Inpatient Consultation    Date of Admission:  3/13/2024  Date of Consult:  3/14/2024    Reason for Consult:   Maternal complaint of decreased fetal movement and a biophysical profile score yesterday 4 out of 8     History of Present Illness:  Carola Fox is a a(n) 23 year old female.  with an IUP at Gestational Age: 38w3d    Who  presented to triage yesterday evening complaining of decreased fetal movement for the past 2 to 3 days.  The fetal nonstress test was reactive but additional testing with a biophysical profile was ordered.  The biophysical profile score overnight was 4 out of 8.      She was then kept for continuous monitoring with the plan for repeat biophysical profile with M today.    Carola reports that she is feeling the baby move.  She notices the movements more subtle than they were a week or so ago but still present.  She is feeling more movement now than when she was concerned and came into triage.  Leaking of fluid or contractions.  Review of History:      OB History:    OB History    Para Term  AB Living   2 0 0 0 1 0   SAB IAB Ectopic Multiple Live Births   1 0 0 0 0      # Outcome Date GA Lbr Babak/2nd Weight Sex Delivery Anes PTL Lv   2 Current            1 SAB  8w0d             Birth Comments: No D&C       Other Relevant History:  Past Medical History:   Diagnosis Date    Anemia     Spontaneous miscarriage (HCC)      No past surgical history on file.  Family History   Problem Relation Age of Onset    Stroke Maternal Grandmother     Cancer Paternal Grandmother         ovarian cancer?    Heart Disease Neg       reports that she has never smoked. She does not have any smokeless tobacco history on file. She reports that she does not currently use alcohol. She reports that she does not use drugs.    Allergies:  No Known Allergies    Medications:    Current Facility-Administered Medications:     acetaminophen (Tylenol Extra  Strength) tab 500 mg, 500 mg, Oral, Q6H PRN **OR** acetaminophen (Tylenol Extra Strength) tab 1,000 mg, 1,000 mg, Oral, Q6H PRN    Physical examination:  Physical Exam:   General: Alert, orientated x3.  Cooperative.  No apparent distress.  Vital Signs: Temp:  [97.5 °F (36.4 °C)-98.4 °F (36.9 °C)] 98.4 °F (36.9 °C)  Pulse:  [63-95] 76  Resp:  [16] 16  BP: (116-136)/(69-90) 117/72  HEENT: Exam is unremarkable.  Abdomen:  Gravid uterus appropriate for GA Nontender.  Extremities:  No lower extremity edema noted.  Skin: Normal texture and turgor.    Fetal tracing:  Reviewed the fetal tracing.  The baseline is 130 bpm.  Moderate variability.  Accelerations noted.  Rare variables.  There was a time overnight I think that the Doppler had moved off the fetus.    Category 1  [place] -no regular contractions    Laboratory Data:       Fetal Ultrasound:  [id number]; [id number]    Ultrasound Findings:  Single IUP in cephalic presentation.    Placenta is posterior, high.   A 3 vessel cord is noted.  Cardiac activity is present at 133 bpm  EFW 2925 g ( 6 lb 7 oz); 21%.    MVP is 3.9 cm . GERMAINE 9.3 cm  BPP is 8/8.       Impression:  Wilhelm intrauterine pregnancy in cephalic presentation  Measurements are consistent with dates  Biophysical profile score 8/8; GERMAINE 9.3 cm    NARRATIVE:   I reviewed with the patient fetal movement monitoring and reasons to call the midwives.  I reviewed with her she did the right thing by coming in for concern about decreased fetal movement.  If she has any recurrent concerns I prompt her to do the same as she did last night.    We discussed focused kick counts and that she should call the midwives if she has not felt 10 movements in an hour to hour and a half when she is focusing on the fetal movements.  She should do fetal movement monitoring once daily.    We also reviewed that moving forward she is to have a repeat nonstress test in 2 to 3 days.    She asked about timing of delivery.  I discussed that elective  inductions are allowable 39 weeks and beyond.  Prior to 39 weeks and needs to be maternal or fetal indication for an induction of labor.    We discussed the recommended plan of care based on her  risk factors.  Carola and her significant other had their questions answered to their satisfaction.      IMPRESSION:    IUP at 38w3d  Reassuring  testing  Fetal growth is appropriate for gestational age    RECOMMENDATIONS:   Resume outpatient management with repeat NST in 2 to 3 days, then weekly NST until delivery    I discussed the patient with Ms. Crisostomo    Total time spent 30 minutes this calendar day which includes preparing to see the patient including chart review, obtaining and/or reviewing additional medical history, performing a physical exam and evaluation, documenting clinical information in the electronic medical record, independently interpreting results, counseling the patient, communicating results to the patient/family/caregiver and coordinating care.     Case discussed with patient who demonstrated understanding and agreement with plan.     Thank you for allowing me to participate in the care of this patient.  Please feel free to contact me with any questions.    Gay Hernandez MD  Maternal-Fetal Medicine        Gay Hernandez MD  3/14/2024  10:31 AM

## 2024-03-14 NOTE — PROGRESS NOTES
Pt is a 23 year old female admitted to 377/377-A.     Chief Complaint   Patient presents with    R/o Labor     Pt reports occasional tightening of the stomach for the last 2-3 days.     R/o Rom     Pt states waking up the last 2-3 days and feeling wet.     Decreased Fetal Movement     Pt reports decrease in baby's movements the last 2-3 days.       Pt is  38w3d intra-uterine pregnancy.  History obtained, consents signed. Oriented to room, staff, and plan of care.

## 2024-03-16 ENCOUNTER — HOSPITAL ENCOUNTER (OUTPATIENT)
Facility: HOSPITAL | Age: 24
Discharge: HOME | End: 2024-03-16
Attending: ADVANCED PRACTICE MIDWIFE | Admitting: OBSTETRICS & GYNECOLOGY
Payer: MEDICAID

## 2024-03-16 ENCOUNTER — NST DOCUMENTATION (OUTPATIENT)
Dept: OBGYN CLINIC | Facility: CLINIC | Age: 24
End: 2024-03-16

## 2024-03-16 ENCOUNTER — APPOINTMENT (OUTPATIENT)
Dept: OBGYN CLINIC | Facility: HOSPITAL | Age: 24
End: 2024-03-16
Payer: MEDICAID

## 2024-03-16 VITALS — DIASTOLIC BLOOD PRESSURE: 83 MMHG | HEART RATE: 101 BPM | RESPIRATION RATE: 16 BRPM | SYSTOLIC BLOOD PRESSURE: 119 MMHG

## 2024-03-16 DIAGNOSIS — Z34.90 PREGNANCY (HCC): ICD-10-CM

## 2024-03-16 PROCEDURE — 59025 FETAL NON-STRESS TEST: CPT

## 2024-03-16 NOTE — PROGRESS NOTES
Pt is a 23 year old female admitted to TR[facility]/TR[facility]-A.     Chief Complaint   Patient presents with    Non-stress Test     Previous Non-Reactive NST       Pt is  38w5d intra-uterine pregnancy.  History obtained, consents signed. Oriented to room, staff, and plan of care.

## 2024-03-16 NOTE — NST
Nonstress Test   Patient: Carola Fox    Gestation: 38w5d    Diagnosis from order: Non-reassuring BPP      Problem List:   Patient Active Problem List   Diagnosis    Pregnancy (HCC)    Vaccine refused by patient    Anemia in preg-unspec (HCC)    Decreased fetal movements in third trimester (HCC)       NST:        3/16/2024   NST DOCUMENTATION   Variability 6-25 BPM   Accelerations Yes   Decelerations None   Baseline 135 BPM   Uterine Irritability No   Contractions Irregular   Acoustic Stimulator No   Nonstress Test Interpretation Reactive   Nonstress Test Second Interpretation Reactive   FHR Category Category I    Category I   Comments  38w5d; NST for previous Non-reactive NST   NST Completed by Jan STALLINGS   Disposition home    Testing Plan Weekly NST   Provider Notified Brown CNM         I agree with the above evaluation. NST completed.  Fanta Prieto CNM  3/16/2024  3:10 PM

## 2024-03-18 ENCOUNTER — APPOINTMENT (OUTPATIENT)
Dept: OBGYN CLINIC | Facility: HOSPITAL | Age: 24
End: 2024-03-18
Attending: ADVANCED PRACTICE MIDWIFE
Payer: MEDICAID

## 2024-03-18 ENCOUNTER — ANESTHESIA EVENT (OUTPATIENT)
Dept: OBGYN UNIT | Facility: HOSPITAL | Age: 24
End: 2024-03-18
Payer: MEDICAID

## 2024-03-18 ENCOUNTER — HOSPITAL ENCOUNTER (INPATIENT)
Facility: HOSPITAL | Age: 24
LOS: 2 days | Discharge: HOME | End: 2024-03-20
Attending: ADVANCED PRACTICE MIDWIFE | Admitting: OBSTETRICS & GYNECOLOGY
Payer: MEDICAID

## 2024-03-18 ENCOUNTER — ANESTHESIA (OUTPATIENT)
Dept: OBGYN UNIT | Facility: HOSPITAL | Age: 24
End: 2024-03-18
Payer: MEDICAID

## 2024-03-18 PROBLEM — D69.6 THROMBOCYTOPENIA DURING PREGNANCY (HCC): Status: ACTIVE | Noted: 2024-03-18

## 2024-03-18 PROBLEM — Z34.90 ENCOUNTER FOR ELECTIVE INDUCTION OF LABOR (HCC): Status: ACTIVE | Noted: 2024-03-18

## 2024-03-18 PROBLEM — O88.213: Status: ACTIVE | Noted: 2024-03-18

## 2024-03-18 PROBLEM — Z34.90 PREGNANT (HCC): Status: ACTIVE | Noted: 2024-03-18

## 2024-03-18 PROBLEM — O99.119 THROMBOCYTOPENIA DURING PREGNANCY (HCC): Status: ACTIVE | Noted: 2024-03-18

## 2024-03-18 PROBLEM — O14.93 PREECLAMPSIA, THIRD TRIMESTER (HCC): Status: ACTIVE | Noted: 2024-03-18

## 2024-03-18 LAB
ALBUMIN SERPL-MCNC: 4.1 G/DL (ref 3.2–4.8)
ALBUMIN/GLOB SERPL: 1.6 {RATIO} (ref 1–2)
ALP LIVER SERPL-CCNC: 106 U/L
ALT SERPL-CCNC: <7 U/L
ANION GAP SERPL CALC-SCNC: 7 MMOL/L (ref 0–18)
ANTIBODY SCREEN: NEGATIVE
AST SERPL-CCNC: 15 U/L (ref ?–34)
BASOPHILS # BLD AUTO: 0.03 X10(3) UL (ref 0–0.2)
BASOPHILS NFR BLD AUTO: 0.4 %
BILIRUB SERPL-MCNC: 1.2 MG/DL (ref 0.3–1.2)
BUN BLD-MCNC: 7 MG/DL (ref 9–23)
BUN/CREAT SERPL: 12.7 (ref 10–20)
CALCIUM BLD-MCNC: 10.1 MG/DL (ref 8.7–10.4)
CHLORIDE SERPL-SCNC: 110 MMOL/L (ref 98–112)
CO2 SERPL-SCNC: 24 MMOL/L (ref 21–32)
CREAT BLD-MCNC: 0.55 MG/DL
CREAT UR-SCNC: 14.8 MG/DL
DEPRECATED RDW RBC AUTO: 48.8 FL (ref 35.1–46.3)
EGFRCR SERPLBLD CKD-EPI 2021: 134 ML/MIN/1.73M2 (ref 60–?)
EOSINOPHIL # BLD AUTO: 0.1 X10(3) UL (ref 0–0.7)
EOSINOPHIL NFR BLD AUTO: 1.4 %
ERYTHROCYTE [DISTWIDTH] IN BLOOD BY AUTOMATED COUNT: 15.9 % (ref 11–15)
GLOBULIN PLAS-MCNC: 2.5 G/DL (ref 2.8–4.4)
GLUCOSE BLD-MCNC: 87 MG/DL (ref 70–99)
HCT VFR BLD AUTO: 36.4 %
HGB BLD-MCNC: 12.7 G/DL
IMM GRANULOCYTES # BLD AUTO: 0.05 X10(3) UL (ref 0–1)
IMM GRANULOCYTES NFR BLD: 0.7 %
LYMPHOCYTES # BLD AUTO: 1.93 X10(3) UL (ref 1–4)
LYMPHOCYTES NFR BLD AUTO: 26.3 %
MCH RBC QN AUTO: 30.2 PG (ref 26–34)
MCHC RBC AUTO-ENTMCNC: 34.9 G/DL (ref 31–37)
MCV RBC AUTO: 86.5 FL
MONOCYTES # BLD AUTO: 0.66 X10(3) UL (ref 0.1–1)
MONOCYTES NFR BLD AUTO: 9 %
NEUTROPHILS # BLD AUTO: 4.56 X10 (3) UL (ref 1.5–7.7)
NEUTROPHILS # BLD AUTO: 4.56 X10(3) UL (ref 1.5–7.7)
NEUTROPHILS NFR BLD AUTO: 62.2 %
OSMOLALITY SERPL CALC.SUM OF ELEC: 289 MOSM/KG (ref 275–295)
PLATELET # BLD AUTO: 121 10(3)UL (ref 150–450)
POTASSIUM SERPL-SCNC: 3.8 MMOL/L (ref 3.5–5.1)
PROT SERPL-MCNC: 6.6 G/DL (ref 5.7–8.2)
PROT UR-MCNC: 11.6 MG/DL (ref ?–14)
PROT/CREAT UR-RTO: 0.78
RBC # BLD AUTO: 4.21 X10(6)UL
RH BLOOD TYPE: POSITIVE
RH BLOOD TYPE: POSITIVE
SODIUM SERPL-SCNC: 141 MMOL/L (ref 136–145)
WBC # BLD AUTO: 7.3 X10(3) UL (ref 4–11)

## 2024-03-18 PROCEDURE — 10907ZC DRAINAGE OF AMNIOTIC FLUID, THERAPEUTIC FROM PRODUCTS OF CONCEPTION, VIA NATURAL OR ARTIFICIAL OPENING: ICD-10-PCS | Performed by: ADVANCED PRACTICE MIDWIFE

## 2024-03-18 PROCEDURE — 10H07YZ INSERTION OF OTHER DEVICE INTO PRODUCTS OF CONCEPTION, VIA NATURAL OR ARTIFICIAL OPENING: ICD-10-PCS | Performed by: ADVANCED PRACTICE MIDWIFE

## 2024-03-18 PROCEDURE — 0UQMXZZ REPAIR VULVA, EXTERNAL APPROACH: ICD-10-PCS | Performed by: ADVANCED PRACTICE MIDWIFE

## 2024-03-18 PROCEDURE — 59409 OBSTETRICAL CARE: CPT | Performed by: ADVANCED PRACTICE MIDWIFE

## 2024-03-18 PROCEDURE — 99222 1ST HOSP IP/OBS MODERATE 55: CPT | Performed by: OBSTETRICS & GYNECOLOGY

## 2024-03-18 PROCEDURE — 3E033VJ INTRODUCTION OF OTHER HORMONE INTO PERIPHERAL VEIN, PERCUTANEOUS APPROACH: ICD-10-PCS | Performed by: ADVANCED PRACTICE MIDWIFE

## 2024-03-18 RX ORDER — ACETAMINOPHEN 500 MG
1000 TABLET ORAL EVERY 6 HOURS PRN
Status: DISCONTINUED | OUTPATIENT
Start: 2024-03-18 | End: 2024-03-20

## 2024-03-18 RX ORDER — ONDANSETRON 2 MG/ML
4 INJECTION INTRAMUSCULAR; INTRAVENOUS EVERY 6 HOURS PRN
Status: DISCONTINUED | OUTPATIENT
Start: 2024-03-18 | End: 2024-03-19 | Stop reason: HOSPADM

## 2024-03-18 RX ORDER — LIDOCAINE HYDROCHLORIDE AND EPINEPHRINE 15; 5 MG/ML; UG/ML
INJECTION, SOLUTION EPIDURAL AS NEEDED
Status: DISCONTINUED | OUTPATIENT
Start: 2024-03-18 | End: 2024-03-18 | Stop reason: SURG

## 2024-03-18 RX ORDER — BISACODYL 10 MG
10 SUPPOSITORY, RECTAL RECTAL ONCE AS NEEDED
Status: DISCONTINUED | OUTPATIENT
Start: 2024-03-18 | End: 2024-03-20

## 2024-03-18 RX ORDER — AMMONIA INHALANTS 0.04 G/.3ML
0.3 INHALANT RESPIRATORY (INHALATION) AS NEEDED
Status: DISCONTINUED | OUTPATIENT
Start: 2024-03-18 | End: 2024-03-20

## 2024-03-18 RX ORDER — BUPIVACAINE HCL/0.9 % NACL/PF 0.25 %
5 PLASTIC BAG, INJECTION (ML) EPIDURAL AS NEEDED
Status: DISCONTINUED | OUTPATIENT
Start: 2024-03-18 | End: 2024-03-19

## 2024-03-18 RX ORDER — MISOPROSTOL 200 UG/1
TABLET ORAL
Status: COMPLETED
Start: 2024-03-18 | End: 2024-03-18

## 2024-03-18 RX ORDER — IBUPROFEN 600 MG/1
600 TABLET ORAL EVERY 6 HOURS
Status: DISCONTINUED | OUTPATIENT
Start: 2024-03-18 | End: 2024-03-20

## 2024-03-18 RX ORDER — HYDROXYZINE HYDROCHLORIDE 50 MG/ML
50 INJECTION, SOLUTION INTRAMUSCULAR EVERY 4 HOURS PRN
Status: DISCONTINUED | OUTPATIENT
Start: 2024-03-18 | End: 2024-03-19

## 2024-03-18 RX ORDER — SIMETHICONE 80 MG
80 TABLET,CHEWABLE ORAL 3 TIMES DAILY PRN
Status: DISCONTINUED | OUTPATIENT
Start: 2024-03-18 | End: 2024-03-20

## 2024-03-18 RX ORDER — MISOPROSTOL 200 UG/1
1000 TABLET ORAL ONCE
Status: COMPLETED | OUTPATIENT
Start: 2024-03-18 | End: 2024-03-18

## 2024-03-18 RX ORDER — ACETAMINOPHEN 500 MG
1000 TABLET ORAL EVERY 6 HOURS PRN
Status: DISCONTINUED | OUTPATIENT
Start: 2024-03-18 | End: 2024-03-19 | Stop reason: HOSPADM

## 2024-03-18 RX ORDER — DEXTROSE, SODIUM CHLORIDE, SODIUM LACTATE, POTASSIUM CHLORIDE, AND CALCIUM CHLORIDE 5; .6; .31; .03; .02 G/100ML; G/100ML; G/100ML; G/100ML; G/100ML
INJECTION, SOLUTION INTRAVENOUS AS NEEDED
Status: DISCONTINUED | OUTPATIENT
Start: 2024-03-18 | End: 2024-03-19 | Stop reason: HOSPADM

## 2024-03-18 RX ORDER — DOCUSATE SODIUM 100 MG/1
100 CAPSULE, LIQUID FILLED ORAL
Status: DISCONTINUED | OUTPATIENT
Start: 2024-03-18 | End: 2024-03-20

## 2024-03-18 RX ORDER — LIDOCAINE HYDROCHLORIDE 10 MG/ML
30 INJECTION, SOLUTION EPIDURAL; INFILTRATION; INTRACAUDAL; PERINEURAL ONCE
Status: DISCONTINUED | OUTPATIENT
Start: 2024-03-18 | End: 2024-03-19 | Stop reason: HOSPADM

## 2024-03-18 RX ORDER — IBUPROFEN 600 MG/1
600 TABLET ORAL ONCE AS NEEDED
Status: DISCONTINUED | OUTPATIENT
Start: 2024-03-18 | End: 2024-03-19 | Stop reason: HOSPADM

## 2024-03-18 RX ORDER — NALBUPHINE HYDROCHLORIDE 10 MG/ML
10 INJECTION, SOLUTION INTRAMUSCULAR; INTRAVENOUS; SUBCUTANEOUS EVERY 4 HOURS PRN
Status: DISCONTINUED | OUTPATIENT
Start: 2024-03-18 | End: 2024-03-19

## 2024-03-18 RX ORDER — ACETAMINOPHEN 500 MG
500 TABLET ORAL EVERY 6 HOURS PRN
Status: DISCONTINUED | OUTPATIENT
Start: 2024-03-18 | End: 2024-03-19 | Stop reason: HOSPADM

## 2024-03-18 RX ORDER — SODIUM CHLORIDE 9 MG/ML
INJECTION, SOLUTION INTRAVENOUS ONCE
Status: DISCONTINUED | OUTPATIENT
Start: 2024-03-18 | End: 2024-03-19

## 2024-03-18 RX ORDER — SODIUM CHLORIDE, SODIUM LACTATE, POTASSIUM CHLORIDE, CALCIUM CHLORIDE 600; 310; 30; 20 MG/100ML; MG/100ML; MG/100ML; MG/100ML
INJECTION, SOLUTION INTRAVENOUS CONTINUOUS
Status: DISCONTINUED | OUTPATIENT
Start: 2024-03-18 | End: 2024-03-19 | Stop reason: HOSPADM

## 2024-03-18 RX ORDER — ONDANSETRON 2 MG/ML
4 INJECTION INTRAMUSCULAR; INTRAVENOUS EVERY 6 HOURS PRN
Status: DISCONTINUED | OUTPATIENT
Start: 2024-03-18 | End: 2024-03-20

## 2024-03-18 RX ORDER — BUPIVACAINE HYDROCHLORIDE 2.5 MG/ML
20 INJECTION, SOLUTION EPIDURAL; INFILTRATION; INTRACAUDAL ONCE
Status: COMPLETED | OUTPATIENT
Start: 2024-03-18 | End: 2024-03-18

## 2024-03-18 RX ORDER — NALBUPHINE HYDROCHLORIDE 10 MG/ML
2.5 INJECTION, SOLUTION INTRAMUSCULAR; INTRAVENOUS; SUBCUTANEOUS
Status: DISCONTINUED | OUTPATIENT
Start: 2024-03-18 | End: 2024-03-19

## 2024-03-18 RX ORDER — TRANEXAMIC ACID 10 MG/ML
INJECTION, SOLUTION INTRAVENOUS
Status: DISPENSED
Start: 2024-03-18 | End: 2024-03-19

## 2024-03-18 RX ORDER — LIDOCAINE HYDROCHLORIDE 10 MG/ML
INJECTION, SOLUTION EPIDURAL; INFILTRATION; INTRACAUDAL; PERINEURAL AS NEEDED
Status: DISCONTINUED | OUTPATIENT
Start: 2024-03-18 | End: 2024-03-18 | Stop reason: SURG

## 2024-03-18 RX ORDER — TERBUTALINE SULFATE 1 MG/ML
0.25 INJECTION, SOLUTION SUBCUTANEOUS AS NEEDED
Status: DISCONTINUED | OUTPATIENT
Start: 2024-03-18 | End: 2024-03-19 | Stop reason: HOSPADM

## 2024-03-18 RX ORDER — TRANEXAMIC ACID 10 MG/ML
1000 INJECTION, SOLUTION INTRAVENOUS ONCE AS NEEDED
Status: ACTIVE | OUTPATIENT
Start: 2024-03-18 | End: 2024-03-18

## 2024-03-18 RX ORDER — ACETAMINOPHEN 500 MG
500 TABLET ORAL EVERY 6 HOURS PRN
Status: DISCONTINUED | OUTPATIENT
Start: 2024-03-18 | End: 2024-03-20

## 2024-03-18 RX ORDER — CITRIC ACID/SODIUM CITRATE 334-500MG
30 SOLUTION, ORAL ORAL AS NEEDED
Status: DISCONTINUED | OUTPATIENT
Start: 2024-03-18 | End: 2024-03-19 | Stop reason: HOSPADM

## 2024-03-18 RX ORDER — BENZOCAINE/MENTHOL 6 MG-10 MG
1 LOZENGE MUCOUS MEMBRANE EVERY 6 HOURS PRN
Status: DISCONTINUED | OUTPATIENT
Start: 2024-03-18 | End: 2024-03-20

## 2024-03-18 RX ADMIN — BUPIVACAINE HYDROCHLORIDE 10 ML: 2.5 INJECTION, SOLUTION EPIDURAL; INFILTRATION; INTRACAUDAL at 17:50:00

## 2024-03-18 RX ADMIN — LIDOCAINE HYDROCHLORIDE 3 ML: 10 INJECTION, SOLUTION EPIDURAL; INFILTRATION; INTRACAUDAL; PERINEURAL at 17:47:00

## 2024-03-18 RX ADMIN — LIDOCAINE HYDROCHLORIDE AND EPINEPHRINE 5 ML: 15; 5 INJECTION, SOLUTION EPIDURAL at 17:49:00

## 2024-03-18 NOTE — CM/SW NOTE
XU self referral due to finances/WIC resources    SW met with patient and FOB  bedside.  SW confirmed face sheet contact as correct.    Baby boy/girl name:Simeon Davenport  Date & time of delivery:3/18/24 @ 10:32pm  Delivery method:Normal spontaneous vaginal delivery  Siblings age:n/a    Patient employed: Denied  Length of maternity leave: n/a    Father of baby employed:Yes  Length of paternity leave:Denied    Breast or formula feed:Breast and formula feed    Pediatrician: Dr. Aminata MCNAIR encouraged pt to schedule infant first appointment (usually within 48 hours of discharge) prior to pt discharge. Pt expressed understanding.     Infant Insurance:Medicaid  [employer] [facility] contacted:Yes    Mental Health History: Denied    Medications:n/a    Therapist:n/a    Psychiatrist:n/a    XU discussed signs, symptoms and risks associated with post partum depression & anxiety.  XU provided pt with PMAD resources.  Other resources provided:[employer] transportation and mental health resources.    Patient support system:FOB    Patient denied current questions/needs from SW.    SW/CM to remain available for support and/or discharge planning.      Fanta Medrano, MSW, LSW  Social Work   Ext:#[phone]

## 2024-03-18 NOTE — ANESTHESIA PREPROCEDURE EVALUATION
Anesthesia PreOp Note    HPI:     Carola Fox is a 23 year old female who presents for preoperative consultation requested by: * No surgeons listed *    Date of Surgery: 3/18/2024    * No procedures listed *  Indication: * No pre-op diagnosis entered *    Relevant Problems   No relevant active problems       NPO:                         History Review:  Patient Active Problem List    Diagnosis Date Noted    Pregnant ([facility]) 03/18/2024    Encounter for elective induction of labor ([facility]) 03/18/2024    Thrombocytopenia during pregnancy ([facility]) 03/18/2024    Preeclampsia, third trimester ([facility]) 03/18/2024    Decreased fetal movements in third trimester ([facility]) 03/03/2024    Anemia in preg-unspec ([facility]) 01/10/2024    Vaccine refused by patient 01/09/2024    Pregnancy ([facility]) 09/20/2023       Past Medical History:   Diagnosis Date    Anemia     Spontaneous miscarriage ([facility])        No past surgical history on file.    Medications Prior to Admission   Medication Sig Dispense Refill Last Dose    Prenatal Vit-Fe Sulfate-FA-DHA (PRENATAL VITAMIN/MIN +DHA) 27-0.8-200 MG Oral Cap Take 1 capsule by mouth daily. 30 capsule 11      Current Facility-Administered Medications Ordered in Epic   Medication Dose Route Frequency Provider Last Rate Last Admin    acetaminophen (Tylenol Extra Strength) tab 500 mg  500 mg Oral Q6H PRN Perlita Crisostomo CNM        acetaminophen (Tylenol Extra Strength) tab 1,000 mg  1,000 mg Oral Q6H PRN Perlita Crisostomo CNM        ibuprofen (Motrin) tab 600 mg  600 mg Oral Once PRN Perlita Crisostomo CNM        ondansetron (Zofran) 4 MG/2ML injection 4 mg  4 mg Intravenous Q6H PRN Perlita Crisostomo CNM        oxyTOCIN in sodium chloride 0.9% (Pitocin) 30 Units/500mL infusion premix  62.5-900 abel-units/min Intravenous Continuous Perlita Crisostomo CNM        terbutaline (Brethine) 1 MG/ML injection 0.25 mg  0.25 mg Subcutaneous PRN Perlita Crisostomo CNM        sodium citrate-citric acid (Bicitra) 500-334 MG/5ML oral solution 30 mL  30  mL Oral PRN Perlita Crisostomo CNM        lidocaine PF (Xylocaine-MPF) 1% injection  30 mL Intradermal Once Perlita Crisostomo CNM        lactated ringers infusion   Intravenous Continuous Perlita Crisostomo  mL/hr at 03/18/24 1106 New Bag at 03/18/24 1106    dextrose in lactated ringers 5% infusion   Intravenous PRN Perlita Crisostomo CNM        lactated ringers IV bolus 500 mL  500 mL Intravenous PRN Perlita Crisostomo CNM        ampicillin (Omnipen) 1 g in sodium chloride 0.9% 100 mL IVPB-MBP  1 g Intravenous Q4H Perlita Crisostomo  mL/hr at 03/18/24 1538 1 g at 03/18/24 1538    oxyTOCIN in sodium chloride 0.9% (Pitocin) 30 Units/500mL infusion premix  0.5-20 abel-units/min Intravenous Continuous Perlita Crisostomo CNM 18 mL/hr at 03/18/24 1628 18 abel-units/min at 03/18/24 1628    nalbuphine (Nubain) 10 mg/mL injection 10 mg  10 mg Intramuscular Q4H PRN Perlita Crisostomo CNM   10 mg at 03/18/24 1447    hydrOXYzine 50 mg/mL injection 50 mg  50 mg Intramuscular Q4H PRN Perlita Crisostomo CNM   50 mg at 03/18/24 1447    lactated ringers IV bolus 1,000 mL  1,000 mL Intravenous Once Perlita Crisostomo CNM        fentaNYL-bupivacaine 2 mcg/mL-0.125% in sodium chloride 0.9% 100 mL EPIDURAL infusion premix   Epidural Continuous Perlita Crisostomo CNM        fentaNYL (Sublimaze) 50 mcg/mL injection 100 mcg  100 mcg Epidural Once Perlita Crisostomo CNM        bupivacaine PF (Marcaine) 0.25% injection  20 mL Epidural Once Perlita Crisostomo CNM        EPHEDrine (PF) 25 MG/5 ML injection 5 mg  5 mg Intravenous PRN Perlita Crisostomo CNM        nalbuphine (Nubain) 10 mg/mL injection 2.5 mg  2.5 mg Intravenous Q15 Min PRN Perlita Crisostomo CNM         No current [facility]-ordered outpatient medications on file.       No Known Allergies    Family History   Problem Relation Age of Onset    Stroke Maternal Grandmother     Cancer Paternal Grandmother         ovarian cancer?    Heart Disease Neg      Social History     Socioeconomic History    Marital status: Single   Tobacco  Use    Smoking status: Never    Tobacco comments:     No smokers in home.    Substance and Sexual Activity    Alcohol use: Not Currently    Drug use: Never       Available pre-op labs reviewed.  Lab Results   Component Value Date    WBC 7.3 03/18/2024    RBC 4.21 03/18/2024    HGB 12.7 03/18/2024    HCT 36.4 03/18/2024    MCV 86.5 03/18/2024    MCH 30.2 03/18/2024    MCHC 34.9 03/18/2024    RDW 15.9 (H) 03/18/2024    .0 (L) 03/18/2024     Lab Results   Component Value Date     03/18/2024    K 3.8 03/18/2024     03/18/2024    CO2 24.0 03/18/2024    BUN 7 (L) 03/18/2024    CREATSERUM 0.55 03/18/2024    GLU 87 03/18/2024    CA 10.1 03/18/2024          Vital Signs:  There is no height or weight on file to calculate BMI.   oral temperature is 98.7 °F (37.1 °C). Her blood pressure is 133/105 (abnormal) and her pulse is 86. Her respiration is 18.   Vitals:    03/18/24 1520 03/18/24 1530 03/18/24 1628 03/18/24 1630   BP: (!) 129/92 139/63 (!) 153/95 (!) 133/105   Pulse: 80 91 95 86   Resp:       Temp:       TempSrc:            Anesthesia Evaluation     Patient summary reviewed and Nursing notes reviewed    No history of anesthetic complications   Airway   Mallampati: II  TM distance: >3 FB  Neck ROM: full  Dental - Dentition appears grossly intact     Pulmonary - negative ROS and normal exam   Cardiovascular - negative ROS and normal exam  Exercise tolerance: good    Neuro/Psych - negative ROS     GI/Hepatic/Renal - negative ROS     Endo/Other - negative ROS     Comments: Preeclampsia  Abdominal  - normal exam                 Anesthesia Plan:   ASA:  2  Emergent    Plan:   Epidural  Post-op Pain Management: Continuous epidural and PCEA  Informed Consent Plan and Risks Discussed With:  Patient      I have informed Carola Fox of the nature of the anesthetic plan, benefits, risks including possible dental damage if relevant, major complications, and any alternative forms of anesthetic management.   All  of the patient's questions were answered to the best of my ability. The patient desires the anesthetic management as planned.  I have informed Carola Fox  of the risks of neuraxial anesthesia including, but not limited to: failure, headache, backache, spinal, unilateral/patchy block, difficulty breathing, infection, bleeding, nerve damage, paralysis, death. The patient desires the proposed neuraxial anesthetic as planned.   ELANA DOHERTY MD  3/18/2024 6:00 PM  Present on Admission:   Encounter for elective induction of labor (HCC)   Anemia in preg-unspec (HCC)   Thrombocytopenia during pregnancy (HCC)

## 2024-03-18 NOTE — PROGRESS NOTES
[facility]  part of [facility]      Labor Progress Note    Carola Fox Patient Status:  Inpatient    2000 MRN [id number]   Location [facility] Attending Perlita Crisostomo CNM   Hosp Day # 0 PCP Nunu Santoro MD     Subjective:   Interval History: none..    Denies h/a, visual changes or epigastric pain  Epidural for pain management    Objective:   Vital signs in last 24 hours:  Temp:  [98.7 °F (37.1 °C)] 98.7 °F (37.1 °C)  Pulse:  [80-95] 86  Resp:  [18] 18  BP: (115-153)/() 133/105    Fetal Surveillance:  Fetal heart variability: moderate  Fetal Heart Rate decelerations: none  Fetal Heart Rate accelerations: yes  Baseline FHR: 135 per minute  Uterine contractions: regular, every 3-5 minutes  Pitocin infusing    Cervix:    Deferred    Assessment & Plan:   Encounter for elective induction of labor ([facility])                PItocin protocol 2    Epidural     Thrombocytopenia in pregnancy                Adm                  Preeclampsia mild   P/C ratio 0.78   Continue to monitor for severe features   Continue to plt   Dr Vega consulted                Anemia in preg-unspec ([facility])  Resolved with admissions labs         Perlita Crisostomo CNM  3/18/2024  5:05 PM

## 2024-03-18 NOTE — H&P
[facility]  part of [facility]    History & Physical    Carola Fox Patient Status:  Inpatient    2000 MRN [id number]   Location [facility] Attending Perlita Crisostomo CNM   Hosp Day # 0 Admitting Sally Woodard MD     Date of Admission:  3/18/2024      HPI:   Carola Fox is 23 year old and  with current gestational age of 39w0d and estimated due date of 3/25/2024, by Last Menstrual Period consistent with 6w2d ultrasound    Carola is being admitted for induction of labor.    Her current obstetrical history is significant for anemia.    Patient reports no complaints .     Fetal Movement: normal.     History   Obstetric History:   OB History    Para Term  AB Living   2       1     SAB IAB Ectopic Multiple Live Births   1              # Outcome Date GA Lbr Babak/2nd Weight Sex Delivery Anes PTL Lv   2 Current            1 SAB  8w0d             Birth Comments: No D&C     Past Medical History:   Past Medical History:   Diagnosis Date    Anemia     Spontaneous miscarriage (HCC)      Past Social History: No past surgical history on file.  Family History:   Family History   Problem Relation Age of Onset    Stroke Maternal Grandmother     Cancer Paternal Grandmother         ovarian cancer?    Heart Disease Neg      Social History:   Social History     Tobacco Use    Smoking status: Never    Smokeless tobacco: Not on file    Tobacco comments:     No smokers in home.    Substance Use Topics    Alcohol use: Not Currently        Allergies/Medications:   Allergies:   No Known Allergies  Medications:  Medications Prior to Admission   Medication Sig Dispense Refill Last Dose    Prenatal Vit-Fe Sulfate-FA-DHA (PRENATAL VITAMIN/MIN +DHA) 27-0.8-200 MG Oral Cap Take 1 capsule by mouth daily. 30 capsule 11        Review of Systems:   Constitutional: denies fever, aches, chills  HEENT: denies visual changes  Skin: denies any unusual skin lesions or  itching  Lungs: denies shortness of breath  Cardiovascular: denies chest pain  GI: denies abdominal pain,denies heartburn, denies constipation or diarrhea  : denies dysuria, pelvic pain, vaginal discharge or bleeding  Musculoskeletal: denies back or joint pain  Neuro denies headaches or dizziness  Psych: denies depression or anxiety    Physical Exam:   Temp:  [98.7 °F (37.1 °C)] 98.7 °F (37.1 °C)  Pulse:  [86-93] 93  Resp:  [18] 18  BP: (115-137)/(64-90) 115/64    Constitutional: alert, appears stated age, and cooperative  Skin: no lesions or erythema  Respiratory: clear, unlabored  Cardiac: regular rate and rhythm   Abdomen: soft, non-tender, EFW 7#  Pelvis: Gynecoid  External Genitalia:  No lesions  Sterile vaginal exam: Dilation: 3.5  Effacement: 80  Station: -2  Position: Cephalic  Extremities: extremities normal, atraumatic, no cyanosis or edema  Musculoskeletal: steady gait  Reflexes: +1/+1  Psych:  Appropriate affect and speech    Results:     Prenatal Results       Initial       Test Value Reference Range Date Time    Blood Type (ABO Group)  O   03/18/24 1018    Rh Factor  Positive   03/18/24 1018    Antibody Screen (Required questions in OE to answer)  Negative   09/14/23 1335    HCT  38.5 % 35.0 - 48.0 09/14/23 1335    HGB  13.3 g/dL 12.0 - 16.0 09/14/23 1335    MCV  85.9 fL 80.0 - 100.0 09/14/23 1335    Platelets  196.0 10(3)uL 150.0 - 450.0 09/14/23 1335    Rubella  Positive  Positive 09/14/23 1335    TREP  Nonreactive  Nonreactive  09/14/23 1335    RPR ([facility])        Urine Culture  No Growth at 18-24 hrs.   09/14/23 1335    Hepatitis B  Nonreactive  Nonreactive  09/14/23 1335    HIV Combo  Non-Reactive  Non-Reactive 09/14/23 1335    HCV  Nonreactive  Nonreactive  09/14/23 1335              Optional Initial Labs       Test Value Reference Range Date Time    TSH        Pap Smear        HPV        GC DNA        Chlamydia DNA        GTT 1 Hr        Glucose Fasting        Glucose 1 Hr        Glucose 2 Hr         Glucose 3 Hr        HgB A1c        Vitamin D                  8-20 Weeks       Test Value Reference Range Date Time    1st Trimester Aneuploidy Risk Assessment        Quad - Down Screen Risk Estimate - prior to 18        Quad - Down Maternal Age Risk - prior to 18        Quad - Trisomy 18 screen Risk Estimate - prior to 18        AFP Spina Bifida (Required questions in OE to answer )        QUAD/AFP - Interpretation        Free Fetal DNA  ^ low risk   10/02/23     Genetic testing        Genetic testing        Genetic testing        GC DNA        Chlamydia DNA                  24-28 Weeks       Test Value Reference Range Date Time    HCT  32.2 % 35.0 - 48.0 24 1357    HGB  10.6 g/dL 12.0 - 16.0 24 1357    Platelets  161.0 10(3)uL 150.0 - 450.0 24 1357    GTT 1 Hr  126 mg/dL See comment 24 1357    Glucose Fasting        Glucose 1 Hr        Glucose 2 Hr        Glucose 3 Hr        TSH         Profile        Urine Culture        GC DNA        Chlamydia DNA                  35-37 Weeks       Test Value Reference Range Date Time    HCT  36.4 % 35.0 - 48.0 24 0921       32.9 % 35.0 - 48.0 24 1149    HGB  12.7 g/dL 12.0 - 16.0 24 0921       10.9 g/dL 12.0 - 16.0 24 1149    Platelets  121.0 10(3)uL 150.0 - 450.0 24 0921       165.0 10(3)uL 150.0 - 450.0 24 1149    TREP  Nonreactive  Nonreactive  24 1357    RPR ([facility])        Genital Group B Culture  Positive  Negative 24 1901    TSH        Urine Culture        HIV Combo  Non-Reactive  Non-Reactive 24 1357    GC DNA        Chlamydia DNA                  Optional Labs       Test Value Reference Range Date Time    HgB A1c        HGB Electrophoresis  (See Report)    23 1335    Varicella Zoster  1,950.00  >165.00 23 1335    Cystic Fibrosis-Old         Cystic Fibrosis[32] (Required questions in OE to answer)        Cystic Fibrosis[165] (Required questions in OE  to answer)        Cystic Fibrosis[165] (Required questions in OE to answer)        Cystic Fibrosis[165] (Required questions in OE to answer)        Sickle Cell        24Hr Urine Protein        24Hr Urine Creatinine        Parvo B19 IgM        Parvo B19 IgG                  Legend    ^: Historical                            Reviewed all prenatal ultrasounds    Admit labs pending    Assessment/Plan:   Carola is 23 year old and  with current gestational age of 39w0d and estimated due date of 3/25/2024, by Last Menstrual Period consistent with 6w2d by ultrasound    Not in labor.  Category 1 FHR tracing  Obstetrical history significant for anemia.    Admission problem(s):      Encounter for elective induction of labor (HCC)   PItocin protocol 2    Thrombocytopenia in pregnancy   Adm                  Elevated BP   0915 137/90   1115 135/90   1145 115/64   Labs ordered           Anemia in preg-unspec (HCC)  Resolved with admissions labs          Risks, benefits, alternatives and possible complications have been discussed in detail with the patient.   Pre-admission, admission, and post admission procedures and expectations were discussed in detail.    All questions answered, all appropriate consents will be signed at the Hospital. Admission is planned for today.   Expectant management..    Perlita Crisostomo CNM  3/18/2024  2:23 PM

## 2024-03-18 NOTE — PROGRESS NOTES
[facility]  part of [facility]      Labor Progress Note    Carola Fox Patient Status:  Inpatient    2000 MRN [id number]   Location [facility] Attending Perlita Crisostomo CNM   Hosp Day # 0 PCP Nunu Santoro MD     Subjective:   Interval History: .    Comfortable with epidural  Consents to AROM and IUPC    Objective:   Vital signs in last 24 hours:  Temp:  [98.7 °F (37.1 °C)] 98.7 °F (37.1 °C)  Pulse:  [] 103  Resp:  [18] 18  BP: (115-153)/() 128/71  SpO2:  [96 %-98 %] 98 %    Fetal Surveillance:  Fetal heart variability: moderate  Fetal Heart Rate decelerations: none  Fetal Heart Rate accelerations: yes  Baseline FHR: 130 per minute  Uterine contractions: regular, every 3-5 minutes  Pitocin infusing  IUPC placed in usual fashion    Cervix:  Dilation:4 cm dilation   Effacement: 80  Station:-2   Consistency:medium  Position: Cephalic  Presentation: vertex    Assessment & Plan:   Encounter for elective induction of labor ([facility])                PItocin protocol 2                Epidural     Thrombocytopenia in pregnancy                Adm                  Preeclampsia mild               P/C ratio 0.78               Continue to monitor for severe features                Continue to monitor plt count               Dr Vega consulted                Anemia in preg-unspec ([facility])  Resolved with admissions labs      Perlita Crisostomo CNM  3/18/2024  6:36 PM

## 2024-03-18 NOTE — PROGRESS NOTES
GYN H&P     3/18/2024  5:14 PM    CC: Patient is here for IOL    HPI: Patient is a 23 year old  for elective IOL at 39 W and noted to have elevated bps'    She denies HA, visual changes, or RUQ pain.         Patient's last menstrual period was 2023 (exact date).    OB History    Para Term  AB Living   2       1     SAB IAB Ectopic Multiple Live Births   1              # Outcome Date GA Lbr Babak/2nd Weight Sex Delivery Anes PTL Lv   2 Current            1 SAB  8w0d             Birth Comments: No D&C       GYN hx:             Past Medical History:   Diagnosis Date    Anemia     Spontaneous miscarriage (HCC)      No past surgical history on file.  No Known Allergies  Family History   Problem Relation Age of Onset    Stroke Maternal Grandmother     Cancer Paternal Grandmother         ovarian cancer?    Heart Disease Neg      Social History     Socioeconomic History    Marital status: Single   Tobacco Use    Smoking status: Never    Tobacco comments:     No smokers in home.    Substance and Sexual Activity    Alcohol use: Not Currently    Drug use: Never     Social History     Social History Narrative    Not on file       Medications reviewed. See active list.     BP (!) 133/105   Pulse 86   Temp 98.7 °F (37.1 °C) (Oral)   Resp 18   LMP 2023 (Exact Date)   Patient Vitals for the past 24 hrs:   BP Temp Temp src Pulse Resp   24 1630 (!) 133/105 -- -- 86 --   24 1628 (!) 153/95 -- -- 95 --   24 1530 139/63 -- -- 91 --   24 1520 (!) 129/92 -- -- 80 --   24 1430 (!) 141/93 -- -- 93 --   24 1145 115/64 -- -- 93 --   24 1115 135/90 -- -- 93 --   24 0915 137/90 98.7 °F (37.1 °C) Oral 86 18       Exam:   GENERAL: well developed, well nourished, in no apparent distress      Component      Latest Ref Rng 3/18/2024   WBC      4.0 - 11.0 x10(3) uL 7.3    RBC      3.80 - 5.30 x10(6)uL 4.21    Hemoglobin      12.0 - 16.0 g/dL 12.7    Hematocrit       35.0 - 48.0 % 36.4    MCV      80.0 - 100.0 fL 86.5    MCH      26.0 - 34.0 pg 30.2    MCHC      31.0 - 37.0 g/dL 34.9    RDW-SD      35.1 - 46.3 fL 48.8 (H)    RDW      11.0 - 15.0 % 15.9 (H)    Platelet Count      150.0 - 450.0 10(3)uL 121.0 (L)    Prelim Neutrophil Abs      1.50 - 7.70 x10 (3) uL 4.56    Neutrophils Absolute      1.50 - 7.70 x10(3) uL 4.56    Lymphocytes Absolute      1.00 - 4.00 x10(3) uL 1.93    Monocytes Absolute      0.10 - 1.00 x10(3) uL 0.66    Eosinophils Absolute      0.00 - 0.70 x10(3) uL 0.10    Basophils Absolute      0.00 - 0.20 x10(3) uL 0.03    Immature Granulocyte Absolute      0.00 - 1.00 x10(3) uL 0.05    Neutrophils %      % 62.2    Lymphocytes %      % 26.3    Monocytes %      % 9.0    Eosinophils %      % 1.4    Basophils %      % 0.4    Immature Granulocyte %      % 0.7    Glucose      70 - 99 mg/dL 87    Sodium      136 - 145 mmol/L 141    Potassium      3.5 - 5.1 mmol/L 3.8    Chloride      98 - 112 mmol/L 110    Carbon Dioxide, Total      21.0 - 32.0 mmol/L 24.0    ANION GAP      0 - 18 mmol/L 7    BUN      9 - 23 mg/dL 7 (L)    CREATININE      0.55 - 1.02 mg/dL 0.55    BUN/CREATININE RATIO      10.0 - 20.0  12.7    CALCIUM      8.7 - 10.4 mg/dL 10.1    CALCULATED OSMOLALITY      275 - 295 mOsm/kg 289    EGFR      >=60 mL/min/1.73m2 134    ALT (SGPT)      10 - 49 U/L <7 (L)    AST (SGOT)      <=34 U/L 15    ALKALINE PHOSPHATASE      52 - 144 U/L 106    Total Bilirubin      0.3 - 1.2 mg/dL 1.2    PROTEIN, TOTAL      5.7 - 8.2 g/dL 6.6    Albumin      3.2 - 4.8 g/dL 4.1    Globulin      2.8 - 4.4 g/dL 2.5 (L)    A/G Ratio      1.0 - 2.0  1.6    TOTAL PROTEIN URINE RANDOM      <14.0 mg/dL 11.6    CREATININE UR RANDOM      mg/dL 14.80    Urine Protein/Creatinine Ratio, Random 0.78       Legend:  (H) High  (L) Low    A/P: Patient is 23 year old female  with mild preeclampsia - continue IOL.       Sally Woodard MD

## 2024-03-18 NOTE — ANESTHESIA PROCEDURE NOTES
Labor Analgesia    Date/Time: 3/18/2024 5:47 PM    Performed by: Serafin Luz MD  Authorized by: Serafin Luz MD      General Information and Staff    Start Time:  3/18/2024 5:47 PM  End Time:  3/18/2024 5:51 PM  Anesthesiologist:  Serafin Luz MD  Performed by:  Anesthesiologist  Patient Location:  OB  Site Identification: surface landmarks    Reason for Block: labor epidural    Preanesthetic Checklist: patient identified, IV checked, site marked, risks and benefits discussed, monitors and equipment checked, pre-op evaluation, timeout performed, IV bolus, anesthesia consent and sterile technique used      Procedure Details    Patient Position:  Sitting  Prep: ChloraPrep and patient draped    Monitoring:  Heart rate, cardiac monitor and continuous pulse ox  Approach:  Midline    Epidural Needle    Injection Technique:  KWASI air  Needle Type:  Tuohy  Needle Gauge:  18 G  Needle Length:  3.5 in  Needle Insertion Depth:  4.5  Location:  L3-4    Spinal Needle      Catheter    Catheter Type:  Multi-orifice  Catheter Size:  20 G  Catheter at Skin Depth:  9  Test Dose:  Negative    Assessment      Additional Comments     Scoliosis noted

## 2024-03-18 NOTE — PAYOR COMM NOTE
--------------  DISCHARGE REVIEW    Payor: [facility]  Subscriber #:  [id number]  Authorization Number: [id number]    Admit date: N/A  Admit time:  N/A  Discharge Date: 3/16/2024  1:07 PM     Admitting Physician: Danuta Pressley DO  Attending Physician:  Erin att. providers found  Primary Care Physician: Nunu Santoro MD       REVIEWER COMMENTS    PT WAS OUTPATIENT IN A BED STATUS      Pt is a 23 year old female admitted to TR1/TR1-A.          Chief Complaint   Patient presents with    Non-stress Test       Previous Non-Reactive NST       Pt is  38w5d intra-uterine pregnancy.      DC 3-16-24

## 2024-03-19 LAB
BASOPHILS # BLD AUTO: 0.03 X10(3) UL (ref 0–0.2)
BASOPHILS NFR BLD AUTO: 0.3 %
DEPRECATED RDW RBC AUTO: 49.6 FL (ref 35.1–46.3)
EOSINOPHIL # BLD AUTO: 0.06 X10(3) UL (ref 0–0.7)
EOSINOPHIL NFR BLD AUTO: 0.6 %
ERYTHROCYTE [DISTWIDTH] IN BLOOD BY AUTOMATED COUNT: 15.4 % (ref 11–15)
HCT VFR BLD AUTO: 32.4 %
HGB BLD-MCNC: 10.8 G/DL
IMM GRANULOCYTES # BLD AUTO: 0.06 X10(3) UL (ref 0–1)
IMM GRANULOCYTES NFR BLD: 0.6 %
LYMPHOCYTES # BLD AUTO: 1.82 X10(3) UL (ref 1–4)
LYMPHOCYTES NFR BLD AUTO: 18.5 %
MCH RBC QN AUTO: 29.3 PG (ref 26–34)
MCHC RBC AUTO-ENTMCNC: 33.3 G/DL (ref 31–37)
MCV RBC AUTO: 87.8 FL
MONOCYTES # BLD AUTO: 1.08 X10(3) UL (ref 0.1–1)
MONOCYTES NFR BLD AUTO: 11 %
NEUTROPHILS # BLD AUTO: 6.78 X10 (3) UL (ref 1.5–7.7)
NEUTROPHILS # BLD AUTO: 6.78 X10(3) UL (ref 1.5–7.7)
NEUTROPHILS NFR BLD AUTO: 69 %
PLATELET # BLD AUTO: 108 10(3)UL (ref 150–450)
PLATELETS.RETICULATED NFR BLD AUTO: 17.5 % (ref 0–7)
RBC # BLD AUTO: 3.69 X10(6)UL
WBC # BLD AUTO: 9.8 X10(3) UL (ref 4–11)

## 2024-03-19 RX ORDER — MELATONIN
325 EVERY OTHER DAY
Status: DISCONTINUED | OUTPATIENT
Start: 2024-03-19 | End: 2024-03-20

## 2024-03-19 RX ORDER — CHOLECALCIFEROL (VITAMIN D3) 25 MCG
1 TABLET,CHEWABLE ORAL DAILY
Status: DISCONTINUED | OUTPATIENT
Start: 2024-03-19 | End: 2024-03-20

## 2024-03-19 NOTE — L&D DELIVERY NOTE
Ruddy, Girl [[id number]]      Labor Events     labor?: No   steroids?: None  Antibiotics received during labor?: Yes  Antibiotics (enter # doses in comment): ampicillin  Rupture date/time: 3/18/2024 1830     Rupture type: AROM  Fluid color: Clear  Labor type: Induced Onset of Labor  Induction: Oxytocin, AROM  Indications for induction: Elective, Preeclampsia  Intrapartum & labor complications: Group B beta strep +, Amnioinfusion, Variable decelerations, Preeclampsia       Labor Event Times    Labor onset date/time: 3/18/2024 1930  Dilation complete date/time: 3/18/2024 2203  Start pushing date/time: 3/18/2024 2217        Presentation    Presentation: Vertex  Position: Right Occiput Anterior       Operative Delivery    Operative Vaginal Delivery: No                Shoulder Dystocia    Shoulder Dystocia: No       Anesthesia    Method: Epidural               Delivery      Head delivery date/time: 3/18/2024 22:32:05   Delivery date/time:  3/18/24 22:32:14   Delivery type: Normal spontaneous vaginal delivery    Details:     Delivery location: delivery room  Delivery Room Temperature: 72       Delivery Providers    Delivering Clinician: Clara Gordon CNM   Delivery personnel:  Provider Role   Akila Mg, RN Baby Nurse   Norma Karimi, RN Delivery Nurse             Cord    Vessels: 3 Vessels  Complications: None  Timed cord clamping: Yes  Time in sec: 60  Cord blood disposition: to lab  Gases sent?: No       Resuscitation    Method: None        Measurements      Weight: 3110 g 6 lb 13.7 oz Length: 50 cm     Head circum.: 33 cm              Placenta    Date/time: 3/18/2024 2235  Removal: Spontaneous  Appearance: Intact  Disposition: Discarded       Apgars    Living status: Living   Apgar Scoring Key:    0 1 2    Skin color Blue or pale Acrocyanotic Completely pink    Heart rate Absent <100 bpm >100 bpm    Reflex irritability No response Grimace Cry or active  withdrawal    Muscle tone Limp Some flexion Active motion    Respiratory effort Absent Weak cry; hypoventilation Good, crying              1 Minute:  5 Minute:  10 Minute:  15 Minute:  20 Minute:      Skin color: 1  1       Heart rate: 2  2       Reflex irritablity: 2  2       Muscle tone: 2  2       Respiratory effort: 2  2       Total: 9  9          Apgars assigned by: CHANDRAKANT ALCALA  [place] disposition: with mother       Skin to Skin    Skin to skin initiated date/time: 3/18/2024 2232  Skin to skin with: Mother       Vaginal Count    Initial count RN: Norma Karimi RN  Initial count Tech: Rodriguez Arley, Debbie   Sponges   Sharps    Initial counts 10   0    Final counts 10   1    Final count RN: Norma Karimi RN  Final count MD: Clara Gordon CNM       Lacerations    Episiotomy: None  Perineal lacerations: None      Periurethral laceration: bilateral Repaired?: Yes     Vaginal laceration?: No      Cervical laceration?: No    Clitoral laceration?: No    Quantitative blood loss (mL): 532              [facility]  part of [facility]    Vaginal Delivery Note    Carola Fox Patient Status:  Inpatient    2000 MRN [id number]   Location [facility] Attending Perlita Crisostomo CNM   Hosp Day # 0 PCP Nunu Santoro MD     Delivery     Infant  Date of Delivery: 3/18/2024    Time of Delivery: 10:32 PM   Delivery Type: Normal spontaneous vaginal delivery     Infant Sex/Birthweight: female 6 lb 13.7 oz (3.11 kg)     Presentation Vertex [1]   Position Right [3]  Occiput [1]  Anterior [1]     Apgars:  1 minute:                 5 minutes:                          10 minutes:      Placenta  Date/Time of Delivery: 3/18/2024 10:35 PM    Delivery: spontaneous  Placenta to Pathology: no  Cord Gases Submitted: no  Cord Blood Collection: yes  Cord Tissue Collection: yes  Cord Complications: none  Sponge and Needle Counts:  Verified yes    Maternal Anesthesia:  epidural   Episiotomy/Laceration Repair  Laceration: periuretheral    Delivery Complications  none    Neonatologist Present: no  Delivery Comment:   Carola progressed to complete dilatation and +3 station prior to pushing successfully to viable baby girl. See Delivery Summary above for time, APGARs, and weight. Fetal head presented in the TONEY position. Sweep for nuchal cord was performed and no nuchal cord identified. Anterior shoulder delivered spontaneously without traction. Baby vigorous at birth. To maternal abdomen for dry and stimulation then cord cut after pulsing ceased. Prophylactic IV pitocin initiated in 3rd stage. Spontaneous placenta by Christina mechanism, complete with 3 vessel cord. Brisk bleeding noted after placenta delivery. Hemostasis achieved by fundal massage, IV Pitocin which was increased to 900mUnits, bimanual massage, and Cytotec MI. Vagina and perineum inspected and bilateral periurethral lacerations noted and repaired with 3.0 Chromic under epidural anesthesia. Mother and infant appeared in stable condition when midwife left the delivery room. Sharps and 4x4 counts were correct. Skin to skin initiated.    Quantitative Blood Loss (mL) 532       Clara Gordon CNM   3/18/2024  10:55 PM

## 2024-03-19 NOTE — CM/SW NOTE
SW self referral due to finances/WIC resources    SW met with patient and FOB  bedside.  SW confirmed face sheet contact as correct.    Baby boy/girl name:Simeon Davenport  Date & time of delivery:3/18/24 @ 10:32pm  Delivery method:Normal spontaneous vaginal delivery  Siblings age:n/a    Patient employed: Denied  Length of maternity leave: n/a    Father of baby employed:Yes  Length of paternity leave:Denied    Breast or formula feed:Breast and formula feed    Pediatrician: Dr. Aminata MCNAIR encouraged pt to schedule infant first appointment (usually within 48 hours of discharge) prior to pt discharge. Pt expressed understanding.     Infant Insurance:Medicaid  [employer] [facility] contacted:Yes    Mental Health History: Denied    Medications:n/a    Therapist:n/a    Psychiatrist:n/a    XU discussed signs, symptoms and risks associated with post partum depression & anxiety.  XU provided pt with PMAD resources.  Other resources provided:[facility] and [facility] Medicaid transportation and mental health resources.    Patient support system:FOB    Patient denied current questions/needs from SW.    SW/CM to remain available for support and/or discharge planning.      Fanta Medrano, MSW, LSW  Social Work   Ext:#[phone]

## 2024-03-19 NOTE — PROGRESS NOTES
[facility]  part of [facility]    OB/GYNE Progress Note      Carola Fox Patient Status:  Inpatient    2000 MRN [id number]   Location [facility] 3SE Attending Perlita Crisostomo CNM   Hosp Day # 1 PCP Nunu Santoro MD        Assessment/Plan   Carola Fox is a 23 year old , day 1 after a vaginal delivery  Breastfeeding without difficulty   Discharge home anticipated tomorrow  Postpartum teaching completed including danger signs and when to call the CNM       (spontaneous vaginal delivery) (HCC    Normal PPD#1      Encounter for elective induction of labor ([facility])   Delivered      Thrombocytopenia during pregnancy ([facility])    today      Preeclampsia, third trimester ([facility])   BP WNL since immediately postpartum   Asymtomatic     Postpartum anemia   Hgb 10.8   Continue oral iron    Discussed with: nurse     patient and partner     Plan discussed with patient who verbalizes understanding and agreement.        Subjective   Currently she denies excessive bleeding or pain. No clots. Denies SOB, chest pain, HA, dizziness. + void. + stool  Support at home: partner and family  Has car seat   Desires POPs    Review of Systems:  Constitutional: Denies   Genitourinary: Denies   Respiratory: Denies   Psychiatric: Denies         Objective   Vital signs in last 24 hours:  Temp:  [98.7 °F (37.1 °C)-99.6 °F (37.6 °C)] 98.7 °F (37.1 °C)  Pulse:  [] 68  Resp:  [18] 18  BP: ()/() 128/88  SpO2:  [96 %-100 %] 98 %    Input/Output:    Intake/Output Summary (Last 24 hours) at 3/19/2024 0908  Last data filed at 3/19/2024 0320  Gross per 24 hour   Intake --   Output 1507 ml   Net -1507 ml       Weight (Last 6):  Wt Readings from Last 6 Encounters:   24 163 lb (73.9 kg)   24 162 lb (73.5 kg)   24 159 lb (72.1 kg)   24 157 lb (71.2 kg)   24 156 lb (70.8 kg)   24 151 lb (68.5 kg)     There is no height or weight on file to calculate BMI.      Exam:  Constitutional: Comfortable and bonding well with infant   Uterus: Fundus firm, below umbilicus   Wound/Incision: Well-approximated, no swelling or edema, small lochia rubra, no clots   Respiratory: Unlabored respirations   Breasts: Nipple/Areola normal bilaterally   Extremities: No edema. No evidence of DVT on exam   Psychiatric: Calm affect, appropriate     DVT Risk Score: Low risk        Results:     Lab Results   Component Value Date    TREPONEMALAB Nonreactive 01/09/2024    ABO O 03/18/2024    RH Positive 03/18/2024    WBC 9.8 03/19/2024    HGB 10.8 (L) 03/19/2024    HCT 32.4 (L) 03/19/2024    .0 (L) 03/19/2024    CREATSERUM 0.55 03/18/2024    BUN 7 (L) 03/18/2024     03/18/2024    K 3.8 03/18/2024     03/18/2024    CO2 24.0 03/18/2024    GLU 87 03/18/2024    CA 10.1 03/18/2024    ALB 4.1 03/18/2024    ALKPHO 106 03/18/2024    BILT 1.2 03/18/2024    TP 6.6 03/18/2024    AST 15 03/18/2024    ALT <7 (L) 03/18/2024       Lab Results   Component Value Date    COLORUR Marj (A) 02/08/2022    CLARITY Cloudy (A) 02/08/2022    SPECGRAVITY 1.025 02/08/2022    PROUR Negative 02/08/2022    GLUUR Negative 02/08/2022    KETUR Negative 02/08/2022    BILUR Negative 02/08/2022    BLOODURINE Large (A) 02/08/2022    NITRITE Negative 02/08/2022    UROBILINOGEN 0.2 02/08/2022    LEUUR Trace (A) 02/08/2022       No results found.          Fanta Prieto CNM  3/19/2024  9:08 AM

## 2024-03-19 NOTE — LACTATION NOTE
LACTATION NOTE - MOTHER      Evaluation Type: Inpatient    Problems identified  Problems identified: Knowledge deficit;Milk supply not WNL  Milk supply not WNL: Reduced (potential)  Problems Identified Other: supplementing with formual    Maternal history  Maternal history: Induction of labor;Anemia;PIH    Breastfeeding goal  Breastfeeding goal: To maintain breast milk feeding per patient goal    Maternal Assessment  Bilateral Breasts: Soft;Symmetrical  Bilateral Nipples: WNL  Prior breastfeeding experience (comment below): Primip  Breastfeeding Assistance: Breastfeeding assistance provided with permission;Breast exam provided with permission;Hand expression provided with permission;Pumping assistance provided with permission    Pain assessment  Location/Comment: denies  Treatment of Sore Nipples: Lanolin    Guidelines for use of:  Breast pump type: Hand Pump              Attempted to breastfeed. Infant sleepy. Encouraged STS. Patient return demonstrated hand expression and spoon feeding. Discussed normal NB behavior. Encouraged to call LC if assistance with breastfeeding is needed.  Baby is on blood sugars and mom assisted with trying to latch, and then did several drops with hand expression, and then spoon fed as well.

## 2024-03-19 NOTE — PLAN OF CARE
Problem: Patient Centered Care  Goal: Patient preferences are identified and integrated in the patient's plan of care  Description: Interventions:    - Provide timely, complete, and accurate information to patient/family  - Incorporate patient and family knowledge, values, beliefs, and cultural backgrounds into the planning and delivery of care  - Encourage patient/family to participate in care and decision-making at the level they choose  - Honor patient and family perspectives and choices  Outcome: Progressing     Problem: POSTPARTUM  Goal: Long Term Goal:Experiences normal postpartum course  Description: INTERVENTIONS:  - Assess and monitor vital signs and lab values.  - Assess fundus and lochia.  - Provide ice/sitz baths for perineum discomfort.  - Monitor healing of incision/episiotomy/laceration, and assess for signs and symptoms of infection and hematoma.  - Assess bladder function and monitor for bladder distention.  - Provide/instruct/assist with pericare as needed.  - Provide VTE prophylaxis as needed.  - Monitor bowel function.  - Encourage ambulation and provide assistance as needed.  - Assess and monitor emotional status and provide social service/psych resources as needed.  - Utilize standard precautions and use personal protective equipment as indicated. Ensure aseptic care of all intravenous lines and invasive tubes/drains.  - Obtain immunization and exposure to communicable diseases history.  Outcome: Progressing  Goal: Optimize infant feeding at the breast  Description: INTERVENTIONS:  - Initiate breast feeding within first hour after birth.   - Monitor effectiveness of current breast feeding efforts.  - Assess support systems available to mother/family.  - Identify cultural beliefs/practices regarding lactation, letdown techniques, maternal food preferences.  - Assess mother's knowledge and previous experience with breast feeding.  - Provide information as needed about early infant feeding cues  (e.g., rooting, lip smacking, sucking fingers/hand) versus late cue of crying.  - Discuss/demonstrate breast feeding aids (e.g., infant sling, nursing footstool/pillows, and breast pumps).  - Encourage mother/other family members to express feelings/concerns, and actively listen.  - Educate father/SO about benefits of breast feeding and how to manage common lactation challenges.  - Recommend avoidance of specific medications or substances incompatible with breast feeding.  - Assess and monitor for signs of nipple pain/trauma.  - Instruct and provide assistance with proper latch.  - Review techniques for milk expression (breast pumping) and storage of breast milk. Provide pumping equipment/supplies, instructions and assistance, as needed.  - Encourage rooming-in and breast feeding on demand.  - Encourage skin-to-skin contact.  - Provide LC support as needed.  - Assess for and manage engorgement.  - Provide breast feeding education handouts and information on community breast feeding support.   Outcome: Progressing  Goal: Establishment of adequate milk supply with medication/procedure interruptions  Description: INTERVENTIONS:  - Review techniques for milk expression (breast pumping).   - Provide pumping equipment/supplies, instructions, and assistance until it is safe to breastfeed infant.  Outcome: Progressing  Goal: Experiences normal breast weaning course  Description: INTERVENTIONS:  - Assess for and manage engorgement.  - Instruct on breast care.  - Provide comfort measures.  Outcome: Progressing  Goal: Appropriate maternal -  bonding  Description: INTERVENTIONS:  - Assess caregiver- interactions.  - Assess caregiver's emotional status and coping mechanisms.  - Encourage caregiver to participate in  daily care.  - Assess support systems available to mother/family.  - Provide /case management support as needed.  Outcome: Progressing

## 2024-03-19 NOTE — PLAN OF CARE
Problem: POSTPARTUM  Goal: Long Term Goal:Experiences normal postpartum course  Description: INTERVENTIONS:  - Assess and monitor vital signs and lab values.  - Assess fundus and lochia.  - Provide ice/sitz baths for perineum discomfort.  - Monitor healing of incision/episiotomy/laceration, and assess for signs and symptoms of infection and hematoma.  - Assess bladder function and monitor for bladder distention.  - Provide/instruct/assist with pericare as needed.  - Provide VTE prophylaxis as needed.  - Monitor bowel function.  - Encourage ambulation and provide assistance as needed.  - Assess and monitor emotional status and provide social service/psych resources as needed.  - Utilize standard precautions and use personal protective equipment as indicated. Ensure aseptic care of all intravenous lines and invasive tubes/drains.  - Obtain immunization and exposure to communicable diseases history.  Outcome: Progressing  Goal: Optimize infant feeding at the breast  Description: INTERVENTIONS:  - Initiate breast feeding within first hour after birth.   - Monitor effectiveness of current breast feeding efforts.  - Assess support systems available to mother/family.  - Identify cultural beliefs/practices regarding lactation, letdown techniques, maternal food preferences.  - Assess mother's knowledge and previous experience with breast feeding.  - Provide information as needed about early infant feeding cues (e.g., rooting, lip smacking, sucking fingers/hand) versus late cue of crying.  - Discuss/demonstrate breast feeding aids (e.g., infant sling, nursing footstool/pillows, and breast pumps).  - Encourage mother/other family members to express feelings/concerns, and actively listen.  - Educate father/SO about benefits of breast feeding and how to manage common lactation challenges.  - Recommend avoidance of specific medications or substances incompatible with breast feeding.  - Assess and monitor for signs of nipple  pain/trauma.  - Instruct and provide assistance with proper latch.  - Review techniques for milk expression (breast pumping) and storage of breast milk. Provide pumping equipment/supplies, instructions and assistance, as needed.  - Encourage rooming-in and breast feeding on demand.  - Encourage skin-to-skin contact.  - Provide LC support as needed.  - Assess for and manage engorgement.  - Provide breast feeding education handouts and information on community breast feeding support.   Outcome: Progressing  Goal: Establishment of adequate milk supply with medication/procedure interruptions  Description: INTERVENTIONS:  - Review techniques for milk expression (breast pumping).   - Provide pumping equipment/supplies, instructions, and assistance until it is safe to breastfeed infant.  Outcome: Progressing  Goal: Experiences normal breast weaning course  Description: INTERVENTIONS:  - Assess for and manage engorgement.  - Instruct on breast care.  - Provide comfort measures.  Outcome: Progressing  Goal: Appropriate maternal -  bonding  Description: INTERVENTIONS:  - Assess caregiver- interactions.  - Assess caregiver's emotional status and coping mechanisms.  - Encourage caregiver to participate in  daily care.  - Assess support systems available to mother/family.  - Provide /case management support as needed.  Outcome: Progressing     Problem: Patient Centered Care  Goal: Patient preferences are identified and integrated in the patient's plan of care  Description: Interventions:  - What would you like us to know as we care for you?   - Provide timely, complete, and accurate information to patient/family  - Incorporate patient and family knowledge, values, beliefs, and cultural backgrounds into the planning and delivery of care  - Encourage patient/family to participate in care and decision-making at the level they choose  - Honor patient and family perspectives and choices  Outcome:  Progressing

## 2024-03-19 NOTE — ANESTHESIA POSTPROCEDURE EVALUATION
Patient: Carola Fox    Procedure Summary       Date: 03/18/24 Room / Location:     Anesthesia Start: 1747 Anesthesia Stop: 2235    Procedure: LABOR ANALGESIA Diagnosis:     Scheduled Providers:  Anesthesiologist: Elana Luz MD    Anesthesia Type: epidural ASA Status: 2 - Emergent            Anesthesia Type: epidural    Vitals Value Taken Time   /119 03/18/24 2330   Temp  03/18/24 2333   Pulse 92 03/18/24 2330   Resp 16 03/18/24 2333   SpO2 98 % 03/18/24 2245   Vitals shown include unfiled device data.    [facility] AN Post Evaluation:   Patient Evaluated in PACU  Patient Participation: complete - patient participated  Level of Consciousness: awake and alert  Pain Score: 0  Pain Management: adequate  Airway Patency:patent  Yes    Nausea/Vomiting: none  Cardiovascular Status: acceptable  Respiratory Status: acceptable  Postoperative Hydration acceptable      ELANA LUZ MD  3/18/2024 11:33 PM

## 2024-03-19 NOTE — PROGRESS NOTES
[facility]  part of [facility]    Labor Progress Note    Carola Fox Patient Status:  Inpatient    2000 MRN [id number]   Location [facility] Attending Perlita Crisostomo CNM   Hosp Day # 0 PCP Nunu Santoro MD     Subjective:   Interval History:   Carola is feeling a lot of rectal pressure now.     Objective:   Vital signs in last 24 hours:  Temp:  [98.7 °F (37.1 °C)-99.4 °F (37.4 °C)] 99.4 °F (37.4 °C)  Pulse:  [] 87  Resp:  [18] 18  BP: ()/() 112/71  SpO2:  [96 %-99 %] 99 %    Input/Output:  No intake or output data in the 24 hours ending 24    Fetal Surveillance:   Fetal Heart Tones (FHTs): 130 with moderate variability, accelerations present, decelerations present - early and variable. Variables improved with amnioinfusion  Uterine contractions (Ucs): q2 minutes    Sterile Vaginal Exam (SVE):   10/100/+3    Other Measures:   Estimated Fetal Weight (EFW): 3200g  Pelvis: gynecoid  Pitocin at 20mUnits  Epidural: infusing  Luz: draining to gravity. Will remove now      Results:   Lab Results   Component Value Date    TREPONEMALAB Nonreactive 2024    ABO O 2024    RH Positive 2024    WBC 7.3 2024    HGB 12.7 2024    HCT 36.4 2024    .0 (L) 2024    CREATSERUM 0.55 2024    BUN 7 (L) 2024     2024    K 3.8 2024     2024    CO2 24.0 2024    GLU 87 2024    CA 10.1 2024    ALB 4.1 2024    ALKPHO 106 2024    BILT 1.2 2024    TP 6.6 2024    AST 15 2024    ALT <7 (L) 2024       Lab Results   Component Value Date    COLORUR Marj (A) 2022    CLARITY Cloudy (A) 2022    SPECGRAVITY 1.025 2022    PROUR Negative 2022    GLUUR Negative 2022    KETUR Negative 2022    BILUR Negative 2022    BLOODURINE Large (A) 2022    NITRITE Negative 2022     UROBILINOGEN 0.2 2022    CAROLYN Trace (A) 2022       Assessment & Plan:     Encounter for elective induction of labor (HCC)  Carola is 23 year old, , with current EGA of 39w0d, here for elective induction of labor but now with diagnosis of pre-eclampsia  Entering 2nd stage  Category 2 FHR Tracing  GBS positive and has received adequate antibiotic treatment, afebrile  Anticipate       Thrombocytopenia during pregnancy ([facility])  Platelets 121 on admission      Preeclampsia, third trimester ([facility])  Asymptomatic  Bps in mild range  Dr Vega consulted and agrees with plan for delivery      Plan discussed with patient who verbalizes understanding and agreement.    Clara Gordon CNM  3/18/2024

## 2024-03-19 NOTE — LACTATION NOTE
This note was copied from a baby's chart.  LACTATION NOTE - INFANT    Evaluation Type  Evaluation Type: Inpatient    Problems & Assessment  Problems Diagnosed or Identified: Sleepy  Infant Assessment: Minimal hunger cues present  Muscle tone: Appropriate for GA    Feeding Assessment  Summary Current Feeding: Breastfeeding with formula supplement  Breastfeeding Assessment: Sleepy infant, recently fed

## 2024-03-20 VITALS
SYSTOLIC BLOOD PRESSURE: 130 MMHG | TEMPERATURE: 98 F | OXYGEN SATURATION: 98 % | DIASTOLIC BLOOD PRESSURE: 95 MMHG | HEART RATE: 78 BPM | RESPIRATION RATE: 16 BRPM

## 2024-03-20 LAB
DEPRECATED RDW RBC AUTO: 51.3 FL (ref 35.1–46.3)
ERYTHROCYTE [DISTWIDTH] IN BLOOD BY AUTOMATED COUNT: 15.9 % (ref 11–15)
HCT VFR BLD AUTO: 35.3 %
HGB BLD-MCNC: 11.6 G/DL
MCH RBC QN AUTO: 29 PG (ref 26–34)
MCHC RBC AUTO-ENTMCNC: 32.9 G/DL (ref 31–37)
MCV RBC AUTO: 88.3 FL
PLATELET # BLD AUTO: 111 10(3)UL (ref 150–450)
RBC # BLD AUTO: 4 X10(6)UL
WBC # BLD AUTO: 7.8 X10(3) UL (ref 4–11)

## 2024-03-20 RX ORDER — IBUPROFEN 600 MG/1
600 TABLET ORAL EVERY 6 HOURS PRN
Qty: 30 TABLET | Refills: 0 | Status: SHIPPED | OUTPATIENT
Start: 2024-03-20

## 2024-03-20 NOTE — PLAN OF CARE
Sat with patient to review plan of care. Discussed analgesic options and answered all questions. VSS. Breastfeeding on demand. Breastfeeding successfully. Voiding independently. Lochia is WNL. Uterus is firm.     Problem: Patient Centered Care  Goal: Patient preferences are identified and integrated in the patient's plan of care  Description: Interventions:  - What would you like us to know as we care for you?   - Provide timely, complete, and accurate information to patient/family  - Incorporate patient and family knowledge, values, beliefs, and cultural backgrounds into the planning and delivery of care  - Encourage patient/family to participate in care and decision-making at the level they choose  - Honor patient and family perspectives and choices  Outcome: Adequate for Discharge     Problem: POSTPARTUM  Goal: Long Term Goal:Experiences normal postpartum course  Description: INTERVENTIONS:  - Assess and monitor vital signs and lab values.  - Assess fundus and lochia.  - Provide ice/sitz baths for perineum discomfort.  - Monitor healing of incision/episiotomy/laceration, and assess for signs and symptoms of infection and hematoma.  - Assess bladder function and monitor for bladder distention.  - Provide/instruct/assist with pericare as needed.  - Provide VTE prophylaxis as needed.  - Monitor bowel function.  - Encourage ambulation and provide assistance as needed.  - Assess and monitor emotional status and provide social service/psych resources as needed.  - Utilize standard precautions and use personal protective equipment as indicated. Ensure aseptic care of all intravenous lines and invasive tubes/drains.  - Obtain immunization and exposure to communicable diseases history.  Outcome: Adequate for Discharge  Goal: Optimize infant feeding at the breast  Description: INTERVENTIONS:  - Initiate breast feeding within first hour after birth.   - Monitor effectiveness of current breast feeding efforts.  - Assess  support systems available to mother/family.  - Identify cultural beliefs/practices regarding lactation, letdown techniques, maternal food preferences.  - Assess mother's knowledge and previous experience with breast feeding.  - Provide information as needed about early infant feeding cues (e.g., rooting, lip smacking, sucking fingers/hand) versus late cue of crying.  - Discuss/demonstrate breast feeding aids (e.g., infant sling, nursing footstool/pillows, and breast pumps).  - Encourage mother/other family members to express feelings/concerns, and actively listen.  - Educate father/SO about benefits of breast feeding and how to manage common lactation challenges.  - Recommend avoidance of specific medications or substances incompatible with breast feeding.  - Assess and monitor for signs of nipple pain/trauma.  - Instruct and provide assistance with proper latch.  - Review techniques for milk expression (breast pumping) and storage of breast milk. Provide pumping equipment/supplies, instructions and assistance, as needed.  - Encourage rooming-in and breast feeding on demand.  - Encourage skin-to-skin contact.  - Provide LC support as needed.  - Assess for and manage engorgement.  - Provide breast feeding education handouts and information on community breast feeding support.   Outcome: Adequate for Discharge  Goal: Establishment of adequate milk supply with medication/procedure interruptions  Description: INTERVENTIONS:  - Review techniques for milk expression (breast pumping).   - Provide pumping equipment/supplies, instructions, and assistance until it is safe to breastfeed infant.  Outcome: Adequate for Discharge  Goal: Experiences normal breast weaning course  Description: INTERVENTIONS:  - Assess for and manage engorgement.  - Instruct on breast care.  - Provide comfort measures.  Outcome: Adequate for Discharge  Goal: Appropriate maternal -  bonding  Description: INTERVENTIONS:  - Assess  caregiver- interactions.  - Assess caregiver's emotional status and coping mechanisms.  - Encourage caregiver to participate in  daily care.  - Assess support systems available to mother/family.  - Provide /case management support as needed.  Outcome: Adequate for Discharge

## 2024-03-20 NOTE — PLAN OF CARE
Problem: Patient Centered Care  Goal: Patient preferences are identified and integrated in the patient's plan of care  Description: Interventions:    - Provide timely, complete, and accurate information to patient/family  - Incorporate patient and family knowledge, values, beliefs, and cultural backgrounds into the planning and delivery of care  - Encourage patient/family to participate in care and decision-making at the level they choose  - Honor patient and family perspectives and choices  Outcome: Progressing     Problem: POSTPARTUM  Goal: Optimize infant feeding at the breast  Description: INTERVENTIONS:  - Initiate breast feeding within first hour after birth.   - Monitor effectiveness of current breast feeding efforts.  - Assess support systems available to mother/family.  - Identify cultural beliefs/practices regarding lactation, letdown techniques, maternal food preferences.  - Assess mother's knowledge and previous experience with breast feeding.  - Provide information as needed about early infant feeding cues (e.g., rooting, lip smacking, sucking fingers/hand) versus late cue of crying.  - Discuss/demonstrate breast feeding aids (e.g., infant sling, nursing footstool/pillows, and breast pumps).  - Encourage mother/other family members to express feelings/concerns, and actively listen.  - Educate father/SO about benefits of breast feeding and how to manage common lactation challenges.  - Recommend avoidance of specific medications or substances incompatible with breast feeding.  - Assess and monitor for signs of nipple pain/trauma.  - Instruct and provide assistance with proper latch.  - Review techniques for milk expression (breast pumping) and storage of breast milk. Provide pumping equipment/supplies, instructions and assistance, as needed.  - Encourage rooming-in and breast feeding on demand.  - Encourage skin-to-skin contact.  - Provide LC support as needed.  - Assess for and manage engorgement.  -  Provide breast feeding education handouts and information on community breast feeding support.   Outcome: Progressing  Goal: Establishment of adequate milk supply with medication/procedure interruptions  Description: INTERVENTIONS:  - Review techniques for milk expression (breast pumping).   - Provide pumping equipment/supplies, instructions, and assistance until it is safe to breastfeed infant.  Outcome: Progressing  Goal: Appropriate maternal -  bonding  Description: INTERVENTIONS:  - Assess caregiver- interactions.  - Assess caregiver's emotional status and coping mechanisms.  - Encourage caregiver to participate in  daily care.  - Assess support systems available to mother/family.  - Provide /case management support as needed.  Outcome: Progressing

## 2024-03-20 NOTE — PROGRESS NOTES
[facility]  part of [facility]    OB/GYNE Progress Note      Carola Fox Patient Status:  Inpatient    2000 MRN [id number]   Location [facility] 3SE Attending Perlita Crisostomo CNM   Hosp Day # 2 PCP Nunu Santoro MD        Assessment/Plan     -Stable PP Day 2. Discharge instructions and warning signs reviewed. Pre-e warning signs reviewed. F/u with CNM in 2 wks for video visit and 6 wks for in person visit.        Encounter for elective induction of labor (HCC)        Thrombocytopenia during pregnancy (HCC)  -Platelet 108 yesterday. Repeat today stable @ 111      Preeclampsia, third trimester (HCC)  -B/p's normal since delivery. Asymptomatic. Plan for f/u in office on Friday for b/p check          Discussed with: {     nurse     patient and partner     Plan discussed with patient who verbalizes understanding and agreement.        Subjective   Feeling well. Bleeding decreasing.Denies headaches, vision changes or epigastric pain. Breast and bottle feeding.      Review of Systems:      Gynecological: lochia  Gastrointestinal: denies  Genitourinary: denies  Constitutional: denies  Cardiovascular: denies  Respiratory: denies  Neurologic: denies  Psychiatric: denies        Objective   Vital signs in last 24 hours:  Temp:  [97.9 °F (36.6 °C)-98.2 °F (36.8 °C)] 97.9 °F (36.6 °C)  Pulse:  [61-78] 78  Resp:  [15-18] 16  BP: (106-137)/(66-93) 125/93    Input/Output:  No intake or output data in the 24 hours ending 24 0836    Weight (Last 6):  Wt Readings from Last 6 Encounters:   24 163 lb (73.9 kg)   24 162 lb (73.5 kg)   24 159 lb (72.1 kg)   24 157 lb (71.2 kg)   24 156 lb (70.8 kg)   24 151 lb (68.5 kg)     There is no height or weight on file to calculate BMI.     Exam:  Breasts: Nipples intact, no cracking or bleeding, + milky discharge  Fundus firm, non-tender, 1FB below umbilicus  Lochia: small rubra  Perineum: well approximated, no  swelling, no hematoma  Calves: Non-tender, no edema, Neg Homans         Buchanan Catheter Information  Does patient have a buchanan catheter: no  Has the buchanan catheter been removed: Not Applicable          Results:     Lab Results   Component Value Date    TREPONEMALAB Nonreactive 01/09/2024    ABO O 03/18/2024    RH Positive 03/18/2024    WBC 9.8 03/19/2024    HGB 10.8 (L) 03/19/2024    HCT 32.4 (L) 03/19/2024    .0 (L) 03/19/2024    CREATSERUM 0.55 03/18/2024    BUN 7 (L) 03/18/2024     03/18/2024    K 3.8 03/18/2024     03/18/2024    CO2 24.0 03/18/2024    GLU 87 03/18/2024    CA 10.1 03/18/2024    ALB 4.1 03/18/2024    ALKPHO 106 03/18/2024    BILT 1.2 03/18/2024    TP 6.6 03/18/2024    AST 15 03/18/2024    ALT <7 (L) 03/18/2024       Lab Results   Component Value Date    COLORUR Marj (A) 02/08/2022    CLARITY Cloudy (A) 02/08/2022    SPECGRAVITY 1.025 02/08/2022    PROUR Negative 02/08/2022    GLUUR Negative 02/08/2022    KETUR Negative 02/08/2022    BILUR Negative 02/08/2022    BLOODURINE Large (A) 02/08/2022    NITRITE Negative 02/08/2022    UROBILINOGEN 0.2 02/08/2022    LEUUR Trace (A) 02/08/2022       No results found.          Rosita Bhatt CNM  3/20/2024  8:36 AM

## 2024-03-20 NOTE — LACTATION NOTE
LACTATION NOTE - MOTHER      Evaluation Type: Inpatient    Problems identified  Problems identified: Knowledge deficit         Breastfeeding goal  Breastfeeding goal: To maintain breast milk feeding per patient goal    Maternal Assessment  Prior breastfeeding experience (comment below): Primip  Breastfeeding Assistance: Breastfeeding assistance provided with permission         Guidelines for use of:  Breast pump type: Ameda Platinum;Hand Pump  Suggested use of pump: Pump if infant is not latching to breast;Pump each time a supplement is offered  Other (comment): Mom has feeding plan and no questions or concerns at this time. Instructed to take hand pump home and encouraged to call Lactation Services for additional support.

## 2024-03-20 NOTE — DISCHARGE INSTRUCTIONS
Pelvic rest for 6 weeks: nothing in the vagina (tampons, intercourse).   May shower, no bathtubs or swimming.    - Call OB for any concerns:     *Heavy bleeding that saturates one pad per hour for several hours/clots bigger than a golf ball     *Signs of preeclampsia (headache, vision changes, nausea/vomiting)     *Increased pain unrelieved by oral medications     *Anxiety or depression     *Breast concerns     *Fever 100.4 or greater     *Dizziness/fainting     *Calf pain, redness or swelling    Follow up with OB in 6 weeks.     Discharge Instructions for Vaginal Delivery  Follow-up  Schedule a  follow-up exam with the midwife who delivered you in 2 weeks and 6 weeks. Please remember to call as soon as possible for this appointment. After having a baby, your body may be very tired. It can take time to recover from a vaginal delivery. Please remember this and call if you have any questions or concerns before your 6 week appointment.   Medications    Please take Motrin at home for pain control 600mg every 6 hours as needed  Continue taking your Prenatal Vitamin daily, as long as you are nursing  Ferrous Sulfate 325 mg once every other day (may obtain in form of Gentle Iron, Slow FE, or liquid Floradix)  Vitamin D3 2000 IU daily  Colace (stool softener)  once or twice per day as needed  If you have stitches  You may have received stitches in the skin near your vagina. The stitches might have closed an episiotomy (an incision that enlarges the opening of the vagina). Or you may have needed stitches to repair torn skin. Either way, your stitches should dissolve within weeks. Until then, you can help reduce discomfort, aid healing, and reduce your risk of infection by keeping the stitches clean. These tips can help:  Gently wipe from front to back after you urinate or have a bowel movement.  After wiping, spray warm water on the area. Or you can have a sitz bath. This means sitting in a tub with a few inches of  water in it. Then pat the area dry or use a hairdryer on a cool setting.  You can take a shower unless told not to.  Change sanitary pads at least every 2 to 4 hours.  Place cold packs as need, but remember to keep a thin towel between the pack and your skin.  Activity  Here are some suggestions:  Get lots of rest. Take naps as often as your can.   Increase your activities gradually.   Don’t have sexual intercourse until after you’ve had a follow-up appointment and you have decided on a birth control method.  Remember your are on pelvic rest, so nothing in your vagina (tampons etc...), no tub baths, and no swimming pools.       When to call your healthcare provider  Call your health care provider right away if you have:  A fever of 100.4°F (38.0°C) or higher  Bleeding that requires a new sanitary pad after an hour, or large blood clots. Remember your bleeding will wax and wane. Please call if you are consistently saturating a pad and hour.   Pain in your vagina that gets worse and isn't relieved with medicine.  Burning, pain, red streaks, or lumpy areas in your breasts that may be accompanied by flu-like symptoms  Nausea or vomiting  Dizziness or fainting  Feelings of extreme sadness or anxiety, or a feeling that you don’t want to be with your baby  Abdominal pain that isn’t relieved with medicine  No bowel movement for 5 days  Redness, warmth, or pain in the lower leg  Chest pain

## 2024-03-20 NOTE — DISCHARGE SUMMARY
[facility]  part of [facility]    Discharge Summary    Carola Fox Patient Status:  Inpatient    2000 MRN [id number]   Location [facility] 3SE Attending Perlita Crisostomo CNM   Hosp Day # 2       Delivering OB Clinician: Clara Gorodn CNM    EDC: Estimated Date of Delivery: 3/25/24    Gestational Age: 39w0d    Antepartum complications:   Patient Active Problem List   Diagnosis    Pregnancy (HCC)    Vaccine refused by patient    Anemia in preg-unspec (HCC)    Decreased fetal movements in third trimester (HCC)    Pregnant ([facility])    Encounter for elective induction of labor ([facility])    Thrombocytopenia during pregnancy (HCC)    Preeclampsia, third trimester ([facility])     (spontaneous vaginal delivery) ([facility])       Date of Delivery: 3/18/2024  Time of Delivery: 10:32 PM     Delivery Type: spontaneous vaginal delivery    Baby: Liveborn female   Information for the patient's :  Janiya Fox [[id number]]   6 lb 13.7 oz (3.11 kg)   Apgars:  1 minute: 9   5 minutes: 9 10 minutes:       Intrapartum Complications: pre-eclampsia    Admit Date: 3/18/2024    Discharge Date: 3/20/2024    Hospital Course:  Mild pre-eclampsia.  Routine delivery and postpartum care    Discharged Condition: stable Pp Day 2. Breast and bottle feeding    Disposition: home    Plan:     Follow-up appointment in 2 weeks with JEANA Stephens CNM  3/20/2024  10:48 AM

## 2024-04-03 ENCOUNTER — TELEPHONE (OUTPATIENT)
Dept: OBGYN CLINIC | Facility: CLINIC | Age: 24
End: 2024-04-03

## 2024-04-03 NOTE — PROGRESS NOTES
Subjective: Carola is 2 weeks postpartum after a vaginal delivery of a baby girl with TM.  Pregnancy complicated by pre-eclampsia- did not come in for PP blood pressure check.   Had bilateral periurethral tears with repair  See L&D delivery summary for birth statistics.  She is without concerns today. Bleeding is decreasing and not experiencing any excessive pain.    Denies headaches, vision changes or epigastric pain. Unable to come to clinic today    Breastfeeding successfully and reports infant is experiencing adequate weight gain.  Baby had a cold last week but doing better.     She denies any signs/symptoms of postpartum depression and has adequate family support. Has sister helping her out.   Denies any abuse.    Contraceptive plan: POP's    Review of Systems   Constitutional: Negative.    Respiratory: Negative.     Cardiovascular: Negative.    Genitourinary: Negative.    Neurological: Negative.    Psychiatric/Behavioral: Negative.         Objective:   There were no vitals filed for this visit.  Wt Readings from Last 6 Encounters:   03/14/24 163 lb (73.9 kg)   03/07/24 162 lb (73.5 kg)   02/20/24 159 lb (72.1 kg)   02/19/24 157 lb (71.2 kg)   02/08/24 156 lb (70.8 kg)   01/25/24 151 lb (68.5 kg)       Physical Exam  Constitutional:       General: She is not in acute distress.     Appearance: Normal appearance. She is not ill-appearing.   Pulmonary:      Effort: Pulmonary effort is normal.   Neurological:      Mental Status: She is alert and oriented to person, place, and time.   Psychiatric:         Mood and Affect: Mood normal.         Behavior: Behavior normal.         Thought Content: Thought content normal.          Assessment/Plan:     Diagnoses and all orders for this visit:    Postpartum exam (HCC)    History of pre-eclampsia        2 weeks postpartum, stable. Breast / Formula feeding without difficulty  Contraception: POP's- denies needing today, will wait until 6 wk PP visit    Return to clinic: 4  weeks    Counseling included:     We discussed risk of Severe PP pre-e. She is unable to come to clinic today and does not have b/p cuff at home. Warning signs reviewed. [place] [facility] [place]p checked and reports she has a [employer] by her house with a b/p cuff and can go today to check. Advised to send a [employer] message with result. Will have RN f/u with her before they leave today. If any symptoms to call CNM. Agrees with plan.      Signs/symptoms of postpartum depression  Postpartum danger signs  Lactation support and troubleshooting  Maternal and family adaption  Sleep/rest strategies  Sexuality  Infant safety and care  Parenting concerns  Occupational concerns  Contraception    Carola verbalized understanding of plan of care. All questions answered. No barriers to learning identified.    This visit is conducted using Telemedicine with live, interactive video and audio.    Patient has been referred to the [facility] website at [email or web address] to review the yearly Consent to Treat document.    Patient understands and accepts financial responsibility for any deductible, co-insurance and/or co-pays associated with this service.

## 2024-04-03 NOTE — TELEPHONE ENCOUNTER
Please call pt and change her visit for Friday to in person 2 week PP visit. She had pre-eclampsia but never came in for a PP blood pressure check.

## 2024-04-05 ENCOUNTER — TELEMEDICINE (OUTPATIENT)
Dept: OBGYN CLINIC | Facility: CLINIC | Age: 24
End: 2024-04-05

## 2024-04-05 DIAGNOSIS — Z87.59 HISTORY OF PRE-ECLAMPSIA: ICD-10-CM

## 2024-04-05 NOTE — TELEPHONE ENCOUNTER
Rosita Bahtt, JEANA  [facility] Clinical Pool  Please f/u with pt before RN leaves for day. Saw her for virtual 2 wk PP today. Had pre-eclampsia but did not come for b/p check. She did not have cuff and could not come today. She is supposed to go to [facility] and then send in her b/p to us. Please make sure this is done or call pt to make sure done. If elevated >/= 140 or 90 please page CNM on call. Thanks ERNESTINE

## 2024-04-24 ENCOUNTER — PATIENT MESSAGE (OUTPATIENT)
Dept: OBGYN CLINIC | Facility: CLINIC | Age: 24
End: 2024-04-24

## 2024-04-25 NOTE — TELEPHONE ENCOUNTER
From: Carola Fox  To: Clara Gordon  Sent: 4/24/2024 6:19 PM CDT  Subject: Postpartum    Is it normal for period to fluctuate between medium and low flow during 5 weeks postpartum?

## 2024-05-01 ENCOUNTER — POSTPARTUM (OUTPATIENT)
Dept: OBGYN CLINIC | Facility: CLINIC | Age: 24
End: 2024-05-01

## 2024-05-01 VITALS
BODY MASS INDEX: 21.19 KG/M2 | HEART RATE: 111 BPM | SYSTOLIC BLOOD PRESSURE: 113 MMHG | HEIGHT: 67 IN | WEIGHT: 135 LBS | DIASTOLIC BLOOD PRESSURE: 76 MMHG

## 2024-05-01 RX ORDER — ACETAMINOPHEN AND CODEINE PHOSPHATE 120; 12 MG/5ML; MG/5ML
0.35 SOLUTION ORAL DAILY
Qty: 84 TABLET | Refills: 3 | Status: SHIPPED | OUTPATIENT
Start: 2024-05-01 | End: 2025-05-01

## 2024-05-01 NOTE — PATIENT INSTRUCTIONS
After Giving Birth: How to Feel Healthy    Helping yourself feel fit is one of the best things you can do for your baby. A little exercise will tone your muscles. You’ll feel stronger and more energized. You’ll also feel more awake and aware. Don’t worry about your weight right now. Your goal is to feel healthy. Part of feeling good is dressing for comfort. If you dress “smart,” you can be a busy new mom and still look great.  Continue Kegel exercises  You may have been told to do Kegel exercises during pregnancy. These exercises strengthen the muscles that are strained by carrying and delivering the baby. You can return to your Kegels as soon as you feel ready. Why not start today? Squeeze your pelvic floor muscles (the ones that control your urine stream) for at least 5 seconds. Relax, then squeeze again. Work your way up to 50 or 100 Kegels a day.  Exercise often  Exercise helps you get in shape. It also strengthens your muscles, so you are better fit for lifting the baby. As an added benefit, exercise gives you a sense that you’re doing something good for yourself. Take your baby for a short walk, or spend 10 minutes stretching. If you were active during pregnancy, you can probably begin light exercise as soon as you feel ready. But be sure to check with your healthcare provider before you begin.  Stay off the scale  For the first month, think about regaining energy and feeling good, not about losing weight. Losing weight too soon can make you feel more tired. Instead, focus on caring for your baby and eating balanced meals. You may lose some weight without even trying, especially if you’re breastfeeding. Once your energy level is back to normal, you can begin to lose weight. A gradual weight loss of 4 or 5 pounds (1.8 or 2.27 kg) a month is safest.  Dress smart  You’ll want to be comfortable during the first days after delivery. Wear a robe, pajamas, or sweats -- whatever feels best. Soon you may want to look  more like your prepregnant self. Do your hair and wear makeup, if you normally do. A loose-fitting dress may feel good. But do yourself a favor: Don’t reach for your jeans. It’s likely to be a month or more before you can wear them. If leaking breasts are a problem, put pads inside your bra and dress in layers. If you’re breastfeeding, shirts that open in front or pullover tops are good choices. A scarf or shawl can be used as a drape if you breastfeed when others are present.  When to call your healthcare provider  Remember to schedule your postpartum visit. If you delivered by , be seen within 2 weeks. For vaginal delivery, be seen 4 to 6 weeks after the birth. Also, call your healthcare provider if you have:  Heavy bleeding  Fever  Redness or persistent lump in breasts  Inability to void or have a bowel movement after 1 week  Severe pain  Worsening depression  [employer] last reviewed this educational content on 2018 The [employer]. [address] [place] [address]. All rights reserved. This information is not intended as a substitute for professional medical care. Always follow your healthcare professional's instructions.        Nutrition While Breastfeeding  Do I need a special diet for breastfeeding?    You don't have to eat a special diet to make enough milk for your baby. Also, your milk will be of good quality for your baby regardless of what you eat. But your body needs fuel to make breastmilk. So eat your fill of a variety of foods. Breastfeeding isn’t an excuse to eat and drink everything you want. But it’s not a reason to avoid favorite foods either.   Healthy diet for the new mother  A healthy diet is recommended for all women and offers many benefits to the new mother. Choosing a variety of healthy foods creates a pattern for the entire family. Each family member benefits. Women who are breastfeeding need about 500 extra calories per day. Some women might  need more, while others might need less. When choosing foods, use the nutrition chart below as a guide.  Bread, cereal, rice, and pasta Vegetables Fruit   Milk, yogurt, and cheese Meat, poultry, fish,  dry beans, eggs, and nuts Fats, oils, and sweets  (use sparingly)   What’s good for you?  Here are some things to do:  Breastfeeding women need to drink when they feel thirsty. There is no specific amount of water you need to drink to make enough milk.  Follow healthy eating guidelines.  Snack on fruit or low-fat dairy products if you’re hungry between meals.  If your healthcare provider recommends it, keep taking prenatal vitamins.  What’s not good for you?  Here are other things to consider:  Limit fatty foods and foods that are high in sugar (cookies, cakes).  Be aware that what enters your body may pass into your breastmilk. Limit caffeine. It is not just in coffee. It is also in cola, tea, and chocolate.  Limit the amount of fish that may contain mercury, such as shark and swordfish.  Talk with your healthcare provider before taking any medicines. It is important to let your healthcare provider know that you are nursing. Some medicines are not safe with breastfeeding.  Remember: Alcohol, cigarettes, and drugs also affect your breastmilk and your baby. Talk with your healthcare provider.  [employer] last reviewed this educational content on 1/1/2018  © 7381-0879 The [employer]. [address] [place] [address], [place] [address]. All rights reserved. This information is not intended as a substitute for professional medical care. Always follow your healthcare professional's instructions.        Breastfeeding: Caring for Yourself  When you have a new little person in your life, it’s easy to forget about yourself. There are new demands on your time. There are also new responsibilities. But it’s important to take care of yourself. This will help you take better care of your new baby.    Healthy habits  Here are some  healthy tips:  Get exercise when you can. If you leak milk, it will help to nurse right before the activity.  Avoid smoking. Smoking is unhealthy for you and may cause you to make less milk. Secondhand smoke is also harmful to your baby.  Talk to your healthcare provider about alcohol, if you choose to drink.  When you’re sick, tell your healthcare provider that you are breastfeeding. Few medicines and illnesses affect breastfeeding, but it is important to check.  Ask your healthcare provider before taking any prescription or over-the-counter medicines, herbs, or supplements.  Comfy clothes  Suggestions for being comfortable when breastfeeding include:  Find a comfortable nursing bra. Many women find underwire uncomfortable. Some stores offer on-site fittings. Ask your healthcare provider or nurse for a referral.  If you have leaking milk, place breast pads inside your bra.  Choose an extra-supportive bra for exercise. Or you can wear two bras at the same time for more support.  Wear loose tops that can be lifted for breastfeeding. You can also buy clothes specially made for breastfeeding moms.  A note about sex  After delivery, it may take a while before your interest in sex returns. Share your feelings with your partner. Your healthcare provider will let you know when it is safe to resume having sex. When you’re ready, know that:  There are several forms of birth control that can be used while breastfeeding. Ask your healthcare provider what to use for pregnancy prevention while you are nursing.  Breastfeeding hormones may cause vaginal dryness. Some women find using a water-based lubricant makes sex more comfortable.  Milk may leak out when you are aroused. Applying pressure on the nipple, using breast pads, or a towel may help with this.  When to call your healthcare provider  Call your healthcare provider if:  You feel overwhelmed and don't know where to turn.  You feel very sad or don’t want to be with your  baby.  You feel like your baby cries all the time and won't be soothed.  You are unable to exercise, or have sex, without discomfort.  You are unsure about a medicine, illness, or activity and its effect on breastfeeding.  [employer] last reviewed this educational content on 2018 The [employer]. [address] [place] [address], [place] [address]. All rights reserved. This information is not intended as a substitute for professional medical care. Always follow your healthcare professional's instructions.        For New Mothers: Staying Fit After Delivery    After you deliver your baby, you can start to exercise when you feel ready. Let your body be your guide. Most women are ready to exercise after 6 weeks, where some women will be ready a few days after giving birth. If you’ve had a  section, you will need more time. Ask your healthcare provider when it is safe to start exercising again.  Exercise tips for new mothers  You can start doing Kegel exercises as soon as you deliver your baby. Do them at least 10 times a day to help avoid bladder problems later on. Kegel exercises help strengthen your pelvic muscles. To do them, squeeze the muscles that you use to stop passing urine (do not do this while urinating). Hold that squeeze for a count of 10, then release.   You will want to resume other exercise gradually and talk to your healthcare provider before starting. Always exercise with care. When you first start exercising after giving birth, try simple exercises that help strengthen major muscle groups, including abdominal and back muscles. Slowly add moderate-intensity exercise. Try to work up to at least 150 minutes of moderate-intensity aerobic activity every week. Moderate intensity means you are moving enough to raise your heart rate and start sweating. You can still talk normally. But you cannot sing. Muscle-strengthening exercises should be done along with your aerobic activity on at  least 2 days a week. Look for ways to combine exercising with being with your new baby. Try putting your baby into a front pack or in the stroller and take a walk.  Strengthening stomach muscles  Many new mothers want to strengthen their stomach muscles after giving birth. Try this exercise when you’re ready to resume your program. It will strengthen the front and side muscles of your stomach:  Lie on your back with your knees bent and feet flat on the floor. Cross your arms over your stomach. Use your fingers to gently pull the sides of the stomach toward the middle of your body.  Exhale and try to pull the stomach muscles toward your spine. Gently raise your shoulders off the floor, no more than 6 to 8 inches. Hold for 5 seconds. Repeat 5 times.  [employer] last reviewed this educational content on 2/1/2018 © 2000-2020 The [employer]. [address] [place] [address]. All rights reserved. This information is not intended as a substitute for professional medical care. Always follow your healthcare professional's instructions.        Understanding Postpartum Depression  You’ve just had a baby. You expected to be excited and happy. But instead you find yourself crying for no reason. You may have trouble coping with your daily tasks. You feel sad, tired, and hopeless most of the time. You may even feel ashamed or guilty. But what you’re going through is not your fault and you can feel better. Talk with your healthcare provider. He or she can help.     What is depression?  Depression is a mood disorder that affects the way you think and feel. The most common symptom is a feeling of deep sadness. You may also feel as if you just can’t cope with life. Other symptoms include:   Gaining or losing a lot of weight  Sleeping too much or too little  Feeling tired all the time  Feeling restless  Crying a lot  Having too little or too much appetite.  Withdrawing from friends and family  Having headaches, aches  and pains, or stomach problems that won't go away  Feeling anger or rage  Fears of harming your baby  Lack of interest in your baby  Feeling worthless or guilty  No longer finding pleasure in things you used to  Having trouble thinking clearly or making decisions  Thinking about death or suicide  Depression after childbirth  You may be weepy and tired right after giving birth. These feelings are normal. They’re sometimes called the “baby blues.” These blues go away after 1 to 2 weeks. However, postpartum (meaning “after birth”) depression lasts much longer and is more severe than the baby blues. It can make you feel sad and hopeless. You may also fear that your baby will be harmed and worry about being a bad mother.   What causes postpartum depression?  The exact cause of postpartum depression is unknown. Changes in brain chemistry or structure are believed to play a big role in depression. It may be due to changes in your hormones during and after childbirth. You may also be tired from caring for your baby and adjusting to being a mother. All these factors may make you feel depressed. In some cases, your genes may also play a role.   Depression can be treated  There are many ways to treat postpartum depression. Talking to your healthcare provider is the first step toward feeling better.   When to call your healthcare provider   Call your healthcare provider if you:    Cry for no clear reason  Have trouble sleeping, eating, and making choices  Question if you can handle caring for a baby  Have intense feelings of sadness, anxiety, or despair that prevent you from being able to do your daily tasks  To learn more  National [place] of Mental Health, 221.430.1854, www.nimh.nih.gov  National [place] on Mental Illness, 225.162.4018, www.anamaria.org  M[facility] [place], 126.495.8945, www.nmha.org  National Suicide Hotline, 978-SUICIDE (967-818-5977)  National Suicide Prevention Lifeline, 882.954.9205,  www.suicidepreventionlifeline.org    [employer] last reviewed this educational content on 5/1/2020 © 2000-2020 The [employer], [employer] [address] [place] [address], [place] [address]. All rights reserved. This information is not intended as a substitute for professional medical care. Always follow your healthcare professional's instructions.        Expressing Your Milk  Work, school, or even a late-night movie can require you to be away from your baby. This doesn't mean you have to give up breastfeeding. Feeding your baby from your breasts is ideal. If you must be away from your baby, you can express milk from your breast. Talk with your healthcare provider about the best ways to feed expressed breastmilk to your infant. But remember, don’t give your baby bottles or pacifiers until he or she is about 4 to 6 weeks old, unless required sooner for health reasons. This helps you both get a good start on breastfeeding. Your baby can get used to your natural nipple first.      Expressing by hand       Expressing with double pump      Always wash your hands before expressing milk from your breast.  Stimulating letdown  Hold a washcloth under very warm water and wring it out.  Place one warm washcloth over each breast to warm them.   Gently massage your breasts to stimulate the milk flow.  Start under the arm and move around the entire breast.   Use the backs of your fingernails to gently scratch the skin of your breasts downward from the outside toward your nipples.   If you’re away from your baby, looking at your baby’s picture can help your milk let down.    Expressing by hand or pump  Your lactation consultant can help you choose the best method for your needs. Here are some tips:   Expressing by hand reduces pressure in swollen or leaky breasts. It may be a good way to start a pumping session. If you need to give expressed milk to your baby in the first few days after delivery, hand expression can often help get more  colostrum than using a pump. Ask your nurse or midwife to teach you how to hand express.  Start expressing within 6 hours of separation from your baby in the hospital.  When  from your baby, it's best to express as often and as long as a baby would breastfeed. Newborns feed 8 to 12 times each 24 hours.  A pump gently pulls your nipple into the cup like a baby’s suck and can be the fastest way to express after your milk comes in. Pumps come in manual, battery-operated, and electric styles. To protect your breasts and the milk you pump, follow the instructions that come with your pump.  For sick or premature babies who aren't feeding at the breast, \"hands-on pumping\" is a special way to help be sure you make enough milk. Hands-on pumping involves a combination of both hand expression and an electrical pump.   Hand expressing while using a pump can increase the amount of milk you can pump. It can also increase the fat content of the pumped milk.  You can usually buy or rent a pump from a drugstore or medical equipment store. Check with your hospital to find out where you can buy or rent a pump.  Working and breastfeeding  Breastfeed your baby all of the time during your maternity leave. This helps set up your milk supply for the whole year.  When your baby is about 2 weeks old, start pumping after you feed the baby. You can freeze this expressed milk. It will help you build a supply for going back to work. Nursing plus pumping will help your breasts make more milk. Feeding your baby from your breasts is ideal. If you must be away from your baby, you can express milk from your breast. Talk with your healthcare provider about the best ways to feed expressed breastmilk to your infant.    Express milk during work breaks. This helps protect your milk supply. It also helps prevent engorged or leaking breasts.  Arrange to breastfeed at lunch if your childcare is nearby. If not, be sure to pump during your lunch  break.  Breastfeed before you leave for work and soon after you return home. Your partner may be able to make dinner while you feed the baby.  Breastfeed at night and on weekends. This will keep up your milk supply. Talk to your healthcare provider about the best ways to feed expressed breastmilk to your infant.    [employer] last reviewed this educational content on 6/1/2020 © 2000-2020 The [employer]. [address] [place] [address], [place] [address]. All rights reserved. This information is not intended as a substitute for professional medical care. Always follow your healthcare professional's instructions.        Storing Expressed Milk    You can express your milk and store it in clean containers. Your family or a sitter can feed it to the baby. This way, your baby gets the benefits of your milk even when you can't be there at feeding time.  Type of storage Storage times   Room temperature     At room temperature (up to 78°F or 26°C)  Tip: Keep the container clean, covered, and cool. 3 to 4 hours is best; 6 to 8 hours is acceptable under very clean conditions   Refrigerator     In a refrigerator (less than 39°F or less than 4°C)  Tip: Place milk in the back of the main section of the refrigerator. 72 hours is best; up to 8 days is acceptable under very clean conditions   Freezer      In a freezer (0°F or -17°C)  Tip: Store milk toward the back of the freezer. 6 months is best; 12 months is acceptable   Guidelines for milk storage  Always use a clean container to collect and store milk. Never pour warm expressed milk into a bottle with cold milk. And be sure to label and date each bottle or bag of milk. To store milk safely, see the chart above.  Warming stored milk  Thaw frozen milk in the refrigerator or in a bowl of warm water. It’s a good idea to warm refrigerated milk before using it. For your baby’s safety:  Use the oldest milk first.  Warm a container of milk by putting it in a bowl of warm (not hot)  water for a few minutes. Or use a bottle warmer set on low.  Gently swirl the milk to mix it. Then place a few drops on your wrist. The milk should be near room temperature.  Don’t put the milk in a microwave. This could create pockets of hot liquid that can burn your baby’s mouth.  [employer] last reviewed this educational content on 7/25/2018 © 2000-2020 The [employer]. [address] [place] [address], [place] [address]. All rights reserved. This information is not intended as a substitute for professional medical care. Always follow your healthcare professional's instructions.        Kegel Exercises  Kegel exercises are done to help strengthen the muscles in your pelvic floor. You don’t need special clothing or equipment. They’re easy to learn and simple to do. And if you do them right, no one can tell you’re doing them, so they can be done almost anywhere. Your healthcare provider, nurse, or physical therapist can answer any questions you have and help you get started.     A weak pelvic floor  The pelvic floor muscles may weaken due to aging, pregnancy and vaginal childbirth, injury, surgery, chronic cough, or lack of exercise. If the pelvic floor is weak, your bladder and other pelvic organs may sag out of place. The urethra may also open too easily and allow urine to leak out. Kegel exercises can help you strengthen your pelvic floor muscles. Then they can better support the pelvic organs and control urine flow.   How Kegel exercises are done  Try each of the Kegel exercises described below. When you’re doing them, try not to move your leg, buttock, or stomach muscles:   Contract as if you were stopping your urine stream. But do it when you’re not urinating.  Tighten your rectum as if trying not to pass gas. Contract your anus, but don’t move your buttocks.  You may place a finger or 2 in the vagina and squeeze your finger with your vagina to learn which muscles to tighten.  Try to hold each Kegel for a slow  count to 5. You probably won’t be able to hold them for that long at first. But keep practicing. It will get easier as your pelvic floor gets stronger. Eventually, special weights that you place in your vagina may be recommended to help make your Kegels even more effective. Talk to your healthcare provider if you have trouble doing Kegel exercises.   Helpful tips  Here are some tips to follow:  Do your Kegels as often as you can. The more you do them, the faster you’ll feel the results.  Pick an activity you do often as a reminder. For instance, do your Kegels every time you sit down.  Tighten your pelvic floor before you sneeze, get up from a chair, cough, laugh, or lift. This can help prevent urine, gas, or stool leakage.  [employer] last reviewed this educational content on 8/1/2020 © 2000-2020 [employer]. [address] [place] [address]. All rights reserved. This information is not intended as a substitute for professional medical care. Always follow your healthcare professional's instructions.   Birth Control Methods  Birth control methods are used to help prevent pregnancy. There are many different methods to choose from. Talk to your healthcare provider about which method is right for you. Be sure to ask your provider about the effectiveness of each method. Also ask about the benefits, risks, and side effects of each method.  Hormones  Some birth control methods work by releasing hormones such as progestin and estrogen. These methods include hormone implants, hormone shots, the vaginal ring, the patch, and birth control pills. They all work by stopping ovulation (release of the egg from the ovary). The implant is a small device that needs to be placed in the upper arm by a trained healthcare provider. It works for up to 3 years. Hormone injections must be repeated every 3 months. The vaginal ring must be replaced monthly (it can be removed during the fourth week of each cycle). The  patch must be replaced weekly (it is not worn during the fourth week of each cycle). Birth control pills must be taken every day. All of these methods are effective and can be stopped at any time.  Intrauterine device (IUD)  An IUD is a small, T-shaped device. It must be placed in the uterus by a trained healthcare provider. There are different types of IUDs available. They work by causing changes in the uterus that make it harder for sperm to reach the egg. Depending on the type of IUD you have, it may work for several years or longer. The IUD is a reversible birth control method. This means it can be removed at any time.  Condom  A condom is a sheath that forms a thin barrier between the penis and the vagina. It helps prevent pregnancy by keeping sperm from entering the vagina. When latex condoms are used, they have the added benefit of protecting against most STIs (sexually transmitted infections). Condoms are used each time there is sexual intercourse and should be discarded after each use. Ask your healthcare provider about the different types of condoms available. These include both the male condom and female condom.  Spermicide  Spermicides come as foams, jellies, creams, suppositories, and tablets.  They help prevent pregnancy by killing sperm. When used alone they are not that reliable. They work best when combined with other birth control methods such as diaphragms and cervical caps.  Sponge, diaphragm, and cervical cap  All of these methods help prevent pregnancy by covering the opening of the uterus (cervix). This prevents sperm from passing through.  The sponge contains spermicide. It can be bought over the counter. The sponge must be left in place for at least 6 hours after the last time you have sex. However, it should not stay in place for more than 24 hours. It should be discarded after it is used.  The diaphragm and cervical cap must be fitted and prescribed by your healthcare provider. Both are  used with spermicide. The diaphragm must be left in place for at least 6 hours after sex. However, it should not stay in place for more than 24 hours. It can be washed and reused. The cervical cap must be left in place for at least 6 hours after sex. However, it should not stay in place for more than 48 hours. It can be washed and reused.  Withdrawal method  This is when the man pulls his penis out of the vagina just before ejaculation (“coming”). This lowers the amount of sperm entering the vagina. Be aware that fluids released just before ejaculation often still contain some sperm, so this method is not as reliable as certain other methods.  Rhythm method  This method requires that you know when in your menstrual cycle you are likely to become pregnant. Then, you avoid sex during those days. This requires careful planning and good discipline. Your healthcare provider can explain more about how this works.  Tubal ligation and vasectomy  These are surgical methods to prevent pregnancy. Tubal ligation is an option for women. The fallopian tubes are blocked or cut (ligated). This keeps the egg from passing into the uterus or sperm from reaching the egg. Vasectomy is an option for men. The tubes that normally carry sperm to the penis are either closed or blocked. Both tubal ligation and vasectomy are permanent birth control methods. This means reversal is either not possible or unlikely to work. They are good choices for women and men who know that they do not want to have children in the future.  [employer] last reviewed this educational content on 11/1/2017  © 5544-0702 The [employer]. [address] [place] [address], [place] [address]. All rights reserved. This information is not intended as a substitute for professional medical care. Always follow your healthcare professional's instructions.        How Birth Control Works  Birth control prevents pregnancy by preventing conception. Some methods prevent an egg from  maturing. Some keep the sperm and egg from meeting. And some methods work in both ways.   Preventing ovulation  Certain hormones help prevent an egg from maturing and being released. Hormone methods include:   Birth control pills  Skin patches  Contraceptive vaginal rings  Injections  Preventing sperm and egg from meeting  Methods that prevent the sperm and egg from joining include:  Barrier methods, such as the condom, the diaphragm, and the cervical cap  Spermicide  The IUD (intrauterine device)  Sterilization  Natural family planning  Some types of hormone methods  [employer] last reviewed this educational content on 4/1/2020 © 2000-2020 [employer]. [address] [place] [id number]. All rights reserved. This information is not intended as a substitute for professional medical care. Always follow your healthcare professional's instructions.        Birth Control: Time-Release Hormones     Time-release hormones require a doctor's prescription.   Certain hormones can help prevent pregnancy. Hormones like the ones used in birth control pills can be taken in other forms. These must be prescribed by your healthcare provider. Because there’s very little for you to do, you may find one of these methods easier to stick to than pills. Side effects for this method will vary depending on the type of time-release hormone you use. Talk to your healthcare provider for more information.   Pregnancy rates  Talk to your healthcare provider about the effectiveness of this birth control method.  Using time-release hormones  Methods to deliver hormones include:  A skin patch placed on your stomach, buttocks, arm, or shoulder. You replace the patch weekly.  A ring that you insert in your vagina, leave in for 3 weeks, and remove for 1 week.  Injections given in your arm or buttocks once every 3 months by your healthcare provider.  An implant placed under the skin in the upper arm by your healthcare provider. This  can be left in place for up to 3 years.  The progestin IUDs placed by your healthcare provider. These can be left in place for 3 to 5 years depending on which one is chosen.  Pros  Lowest pregnancy rate of the birth control methods that can be reversed  No interruption to sex  Easy to use  Don’t require taking a pill each day  May decrease menstrual cramps, menstrual flow, and acne    Cons  Don't protect against sexually transmitted infections (STIs)  May cause irregular periods  May cause side effects such as nausea, weight gain, headaches, breast tenderness, fatigue, or mood changes (these often go away within 3 months)  May take up to a year for you to become fertile (able to get pregnant) after stopping injections  May increase the risk of blood clots, heart attack, and stroke    Time-release hormones may not be for you  Time-release hormones may not be for you if:  You are a smoker and over age 35  You have high blood pressure, gallbladder disease, liver disease, certain lipid disorders, cerebrovascular disease (stroke), or heart disease  You have diabetes, migraines, clot in a vein or artery (thromboembolic disorder), lupus, or take medicines that may interfere with the hormones  In these cases, discuss the risks with your healthcare provider.  [employer] last reviewed this educational content on 4/1/2020  © 8847-4649 [employer]. [address] [place] [address], [place] [address]. All rights reserved. This information is not intended as a substitute for professional medical care. Always follow your healthcare professional's instructions.        Birth Control: Natural Family Planning     To use NFP, you keep daily records of the signs that show when you are fertile.   Natural family planning (NFP) is based on a woman's awareness of when she is likely to become pregnant (fertile). By learning how to tell when you're fertile, you can know when to not have sex. This can help prevent pregnancy. To learn NFP,  it's advised that you take a class or work with a qualified teacher.   Pregnancy rates  Talk to your healthcare provider about how well this birth control method works.   Using NFP  A woman is fertile only during a certain part of her monthly cycle--just before and during ovulation. By learning when you ovulate, you can know when you're likely to be fertile. You estimate when you're ovulating by observing and keeping track of certain physical signs. You can then avoid sex or use a barrier method during that fertile time. But be aware that each woman's cycle and signs are different, and no woman's cycle is perfectly regular.   Pros  Both partners share responsibility  No known health risks  No side effects  Is inexpensive or free  If you abstain from sex during fertile periods, this method is approved by all [employer] communities  Easy to stop if you decide you want to become pregnant    Cons  Takes time to learn  Requires daily observation and charting  Requires abstaining from sex or using a barrier method during fertile periods (nine or more days per month)  Does not protect against sexually transmitted infections (STIs)    When natural family planning may not be for you  Natural family planning may not be for you if:  You don't have the full cooperation of your partner  You haven't received training from a qualified teacher  Your periods are not regular  You take certain medicines or should not get pregnant due to a medical condition  You just started having periods or you are approaching menopause  [employer] last reviewed this educational content on 5/1/2020  © 1735-2490 [employer]. [address] [place] [address], [place] [address]. All rights reserved. This information is not intended as a substitute for professional medical care. Always follow your healthcare professional's instructions.        Birth Control: IUD (Intrauterine Device)    The IUD (intrauterine device) is small, flexible, and T-shaped. A  trained healthcare provider places it in the uterus. The IUD is one of the most effective birth control methods. It's also reversible. This means it can be removed at any time by a trained healthcare provider. New IUDs are safe and don't have the risks of older types of IUDs.   Pregnancy rates  Talk to your healthcare provider about the effectiveness of this birth control method.  Types of IUDs  IUD insertion is done in the healthcare provider’s office. Two types of IUDs are available:  The copper IUD releases a small amount of copper into the uterus. The copper makes it harder for sperm to reach the egg. The device works for at least 10 years.  The progestin IUD releases a hormone called progestin. It causes changes in the uterus to help prevent pregnancy. The device works for 3 to 5 years, depending on which device is chosen. It may be recommended for women who have anemia or heavy and painful periods.  IUDs have thin strings that hang from the opening of the uterus into the vagina. This lets you check that the IUD stays in place.   Things to know about IUDs  IUDs can be used by women who have never been pregnant or by women with a history of sexually transmitted infections (STIs) or tubal pregnancy.  It won't move from the uterus to any other part of the body.  There is a slight risk of the device coming out of the vagina (expulsion).  It may not work in women who have an abnormally shaped uterus.  A copper IUD may cause heavier periods and cramping.  Progestin IUD may cause light periods or no periods at all (irregular bleeding or spotting is possible and normal during first 3 to 6 months).  If you get a sexually transmitted infection with an IUD in place, symptoms may be more severe.    What to report to your healthcare provider  Be sure your healthcare provider knows if you have:  A sexually transmitted infection (STI) or possible STI  Liver problems  Blood clots (for progestin IUD only)  Breast cancer or a  history of breast cancer (progestin IUD only)  [employer] last reviewed this educational content on 5/1/2020 © 2000-2020 [employer]. [address] [place] [address], [place] [address]. All rights reserved. This information is not intended as a substitute for professional medical care. Always follow your healthcare professional's instructions.        Levonorgestrel intrauterine device (IUD)  Brand Names: Kyleena, LILETTA, Mirena, Christy  What is this medicine?  LEVONORGESTREL IUD (TIM voe nor reanna trel) is a contraceptive (birth control) device. The device is placed inside the uterus by a healthcare professional. It is used to prevent pregnancy. This device can also be used to treat heavy bleeding that occurs during your period.  How should I use this medicine?  This device is placed inside the uterus by a health care professional.  Talk to your pediatrician regarding the use of this medicine in children. Special care may be needed.  What side effects may I notice from receiving this medicine?  Side effects that you should report to your doctor or health care professional as soon as possible:  allergic reactions like skin rash, itching or hives, swelling of the face, lips, or tongue  fever, flu-like symptoms  genital sores  high blood pressure  no menstrual period for 6 weeks during use  pain, swelling, warmth in the leg  pelvic pain or tenderness  severe or sudden headache  signs of pregnancy  stomach cramping  sudden shortness of breath  trouble with balance, talking, or walking  unusual vaginal bleeding, discharge  yellowing of the eyes or skin  Side effects that usually do not require medical attention (report to your doctor or health care professional if they continue or are bothersome):  acne  breast pain  change in sex drive or performance  changes in weight  cramping, dizziness, or faintness while the device is being inserted  headache  irregular menstrual bleeding within first 3 to 6 months of  use  nausea  What may interact with this medicine?  Do not take this medicine with any of the following medications:  amprenavir  bosentan  fosamprenavir  This medicine may also interact with the following medications:  aprepitant  armodafinil  barbiturate medicines for inducing sleep or treating seizures  bexarotene  boceprevir  griseofulvin  medicines to treat seizures like carbamazepine, ethotoin, felbamate, oxcarbazepine, phenytoin, topiramate  modafinil  pioglitazone  rifabutin  rifampin  rifapentine  some medicines to treat HIV infection like atazanavir, efavirenz, indinavir, lopinavir, nelfinavir, tipranavir, ritonavir  [place]'s wort  warfarin  What if I miss a dose?  This does not apply. Depending on the brand of device you have inserted, the device will need to be replaced every 3 to 6 years if you wish to continue using this type of birth control.  Where should I keep my medicine?  This does not apply.  What should I tell my health care provider before I take this medicine?  They need to know if you have any of these conditions:  abnormal Pap smear  cancer of the breast, uterus, or cervix  diabetes  endometritis  genital or pelvic infection now or in the past  have more than one sexual partner or your partner has more than one partner  heart disease  history of an ectopic or tubal pregnancy  immune system problems  IUD in place  liver disease or tumor  problems with blood clots or take blood-thinners  seizures  use intravenous drugs  uterus of unusual shape  vaginal bleeding that has not been explained  an unusual or allergic reaction to levonorgestrel, other hormones, silicone, or polyethylene, medicines, foods, dyes, or preservatives  pregnant or trying to get pregnant  breast-feeding  What should I watch for while using this medicine?  Visit your doctor or health care professional for regular check ups. See your doctor if you or your partner has sexual contact with others, becomes HIV positive, or  gets a sexual transmitted disease.  This product does not protect you against HIV infection (AIDS) or other sexually transmitted diseases.  You can check the placement of the IUD yourself by reaching up to the top of your vagina with clean fingers to feel the threads. Do not pull on the threads. It is a good habit to check placement after each menstrual period. Call your doctor right away if you feel more of the IUD than just the threads or if you cannot feel the threads at all.  The IUD may come out by itself. You may become pregnant if the device comes out. If you notice that the IUD has come out use a backup birth control method like condoms and call your health care provider.  Using tampons will not change the position of the IUD and are okay to use during your period.  This IUD can be safely scanned with magnetic resonance imaging (MRI) only under specific conditions. Before you have an MRI, tell your healthcare provider that you have an IUD in place, and which type of IUD you have in place.  NOTE:This sheet is a summary. It may not cover all possible information. If you have questions about this medicine, talk to your doctor, pharmacist, or health care provider. Copyright© 2020 [employer]        Etonogestrel implant  What is this medicine?  ETONOGESTREL (et oh tayler ELLIOTT trel) is a contraceptive (birth control) device. It is used to prevent pregnancy. It can be used for up to 3 years.  How should I use this medicine?  This device is inserted just under the skin on the inner side of your upper arm by a health care professional.  Talk to your pediatrician regarding the use of this medicine in children. Special care may be needed.  What side effects may I notice from receiving this medicine?  Side effects that you should report to your doctor or health care professional as soon as possible:  allergic reactions like skin rash, itching or hives, swelling of the face, lips, or tongue  breast lumps  changes in emotions or  moods  depressed mood  heavy or prolonged menstrual bleeding  pain, irritation, swelling, or bruising at the insertion site  scar at site of insertion  signs of infection at the insertion site such as fever, and skin redness, pain or discharge  signs of pregnancy  signs and symptoms of a blood clot such as breathing problems; changes in vision; chest pain; severe, sudden headache; pain, swelling, warmth in the leg; trouble speaking; sudden numbness or weakness of the face, arm or leg  signs and symptoms of liver injury like dark yellow or brown urine; general ill feeling or flu-like symptoms; light-colored stools; loss of appetite; nausea; right upper belly pain; unusually weak or tired; yellowing of the eyes or skin  unusual vaginal bleeding, discharge  signs and symptoms of a stroke like changes in vision; confusion; trouble speaking or understanding; severe headaches; sudden numbness or weakness of the face, arm or leg; trouble walking; dizziness; loss of balance or coordination  Side effects that usually do not require medical attention (report to your doctor or health care professional if they continue or are bothersome):  acne  back pain  breast pain  changes in weight  dizziness  general ill feeling or flu-like symptoms  headache  irregular menstrual bleeding  nausea  sore throat  vaginal irritation or inflammation  What may interact with this medicine?  Do not take this medicine with any of the following medications:  amprenavir  fosamprenavir  This medicine may also interact with the following medications:  acitretin  aprepitant  armodafinil  bexarotene  bosentan  carbamazepine  certain medicines for fungal infections like fluconazole, ketoconazole, itraconazole and voriconazole  certain medicines to treat hepatitis, HIV or AIDS  cyclosporine  felbamate  griseofulvin  lamotrigine  modafinil  oxcarbazepine  phenobarbital  phenytoin  primidone  rifabutin  rifampin  rifapentine  [facility]  wort  topiramate  What if I miss a dose?  This does not apply.  Where should I keep my medicine?  This drug is given in a hospital or clinic and will not be stored at home.  What should I tell my health care provider before I take this medicine?  They need to know if you have any of these conditions:  abnormal vaginal bleeding  blood vessel disease or blood clots  breast, cervical, endometrial, ovarian, liver, or uterine cancer  diabetes  gallbladder disease  heart disease or recent heart attack  high blood pressure  high cholesterol or triglycerides  kidney disease  liver disease  migraine headaches  seizures  stroke  tobacco smoker  an unusual or allergic reaction to etonogestrel, anesthetics or antiseptics, other medicines, foods, dyes, or preservatives  pregnant or trying to get pregnant  breast-feeding  What should I watch for while using this medicine?  This product does not protect you against HIV infection (AIDS) or other sexually transmitted diseases.  You should be able to feel the implant by pressing your fingertips over the skin where it was inserted. Contact your doctor if you cannot feel the implant, and use a non-hormonal birth control method (such as condoms) until your doctor confirms that the implant is in place. Contact your doctor if you think that the implant may have broken or become bent while in your arm.  You will receive a user card from your health care provider after the implant is inserted. The card is a record of the location of the implant in your upper arm and when it should be removed. Keep this card with your health records.  NOTE:This sheet is a summary. It may not cover all possible information. If you have questions about this medicine, talk to your doctor, pharmacist, or health care provider. Copyright© 2020 [employer]

## 2024-05-01 NOTE — PROGRESS NOTES
Chief Complaint   Patient presents with    Postpartum Care     6 week pp, vaginal birth, patient still having light bleeding, interested in BC pill.   ETHEL-7 score 2        HPI:   Carola is 23 year old , here today for 6-week postpartum visit.  Type and date of Delivery: , Complications: pre-eclampsia  See Delivery Summary for baby's gender and weight  Perineum: no concerns  Feeding Method: breast & formula, no concerns  Bonding: well  Maternal sleep: getting good stretches  Sexual activity resumed: No. Contraceptive Method: POPs  Postpartum Depression screen: denies    Pt offered for MA to be present during exam and patient declined.    HISTORY:  Patient Active Problem List   Diagnosis    Anemia in preg-unspec ([facility])    Thrombocytopenia during pregnancy ([facility])    Preeclampsia, third trimester ([facility])     (spontaneous vaginal delivery) ([facility])       Medications (Active prior to today's visit):  Current Outpatient Medications   Medication Sig Dispense Refill    Norethindrone (ORTHO MICRONOR) 0.35 MG Oral Tab Take 1 tablet (0.35 mg total) by mouth daily. 84 tablet 3    ibuprofen 600 MG Oral Tab Take 1 tablet (600 mg total) by mouth every 6 (six) hours as needed for Pain. (Patient not taking: Reported on 2024) 30 tablet 0    Prenatal Vit-Fe Sulfate-FA-DHA (PRENATAL VITAMIN/MIN +DHA) 27-0.8-200 MG Oral Cap Take 1 capsule by mouth daily. (Patient not taking: Reported on 2024) 30 capsule 11       Allergies:   No Known Allergies    PHYSICAL EXAM:   Vitals:    24 1221   BP: 113/76   Pulse: 111       Pre-pregnancy 22.14   Today's Body Mass Index Body mass index is 21.14 kg/m².    Pre-pregnancy weight 130 lbs  Today's weight 135 lbs    Physical Exam  Vitals reviewed.   Constitutional:       Appearance: Normal appearance.   HENT:      Head: Normocephalic.   Pulmonary:      Effort: Pulmonary effort is normal.   Abdominal:      Comments: Rectus abdominus muscles 1 finger-breath diastasis  Uterus palpates  within the pelvis by abdominal exam     Genitourinary:     Comments: Well-healed  Good tone with Kegel's    Neurological:      Mental Status: She is alert and oriented to person, place, and time.   Psychiatric:         Behavior: Behavior normal.         Thought Content: Thought content normal.         Judgment: Judgment normal.             ASSESSMENT/PLAN:   Carola was seen today for postpartum care.    Diagnoses and all orders for this visit:    Postpartum care and examination (HCC)  -     Norethindrone (ORTHO MICRONOR) 0.35 MG Oral Tab; Take 1 tablet (0.35 mg total) by mouth daily.         Start Taking               Norethindrone (ORTHO MICRONOR) 0.35 MG Oral Tab Take 1 tablet (0.35 mg total) by mouth daily.           6 weeks postpartum, stable, breast and formula feeding, mini pills for contraception  CBC ordered due to H/O anemia and thrombocytopenia    Next annual due: 3 months  Follow-up/Return to clinic: PRN or 3 months    Counseling:   Postpartum teaching including maternal and family adaptation, sleep/rest strategies, lactation and weaning (if applicable), reaching ideal body weight, sexuality/contraception, importance of Kegel's and abdominal exercises, continuation of prenatal vitamins, and signs/symptoms of postpartum depression  Patient verbalized understanding, All questions answered. No barriers to learning identified

## 2024-05-02 ENCOUNTER — TELEPHONE (OUTPATIENT)
Dept: OBGYN UNIT | Facility: HOSPITAL | Age: 24
End: 2024-05-02

## 2024-05-17 ENCOUNTER — OFFICE VISIT (OUTPATIENT)
Dept: OBGYN CLINIC | Facility: CLINIC | Age: 24
End: 2024-05-17

## 2024-05-17 ENCOUNTER — TELEPHONE (OUTPATIENT)
Dept: OBGYN CLINIC | Facility: CLINIC | Age: 24
End: 2024-05-17

## 2024-05-17 VITALS — HEART RATE: 76 BPM | DIASTOLIC BLOOD PRESSURE: 73 MMHG | SYSTOLIC BLOOD PRESSURE: 103 MMHG

## 2024-05-17 DIAGNOSIS — N92.6 IRREGULAR BLEEDING: ICD-10-CM

## 2024-05-17 DIAGNOSIS — N71.9 ENDOMETRITIS: Primary | ICD-10-CM

## 2024-05-17 PROCEDURE — 99213 OFFICE O/P EST LOW 20 MIN: CPT | Performed by: ADVANCED PRACTICE MIDWIFE

## 2024-05-17 RX ORDER — CEPHALEXIN 500 MG/1
500 CAPSULE ORAL 4 TIMES DAILY
Qty: 40 CAPSULE | Refills: 0 | Status: SHIPPED | OUTPATIENT
Start: 2024-05-17 | End: 2024-05-27

## 2024-05-17 NOTE — TELEPHONE ENCOUNTER
Patient 8 wks post partum, still bleeding, it's a medium flow. Patient has never completely stopped after delivering

## 2024-05-17 NOTE — PROGRESS NOTES
Chief Complaint   Patient presents with    Vaginal Bleeding     Pt just had a baby 8 weeks ago and she is still bleeding would like to be check        HPI:   Carola is 24 year old , here today with concern of post partum bleeding. Pt delivered 3/18 and had a QBL of 532. Her pregnancy/birth was complicated with pre-eclampsia. Bleeding lightened up 2 weeks ago and has been a medium to light flow since. Patient is using a light pad and is changing it twice a day. Patient denies clots, fever, chills, body aches. Denies vision changes, headaches, abnormal swelling. Patient denies discharge, odor and irritation.     Patient has not started progesterone only birth control as she has not been sexually active since birth.    Patient is mostly breastfeeding and intermittently formula feeding.     JOSE GUADALUPE Severino and Clara Gordon CNM present for exam    Patient Active Problem List   Diagnosis    Anemia in preg-unspec ([facility])    Thrombocytopenia during pregnancy ([facility])    Preeclampsia, third trimester ([facility])     (spontaneous vaginal delivery) ([facility])        Note: This is a gyn only visit. Pt has PCP and is referred back to PCP for care of any non-gyn concerns listed above in the Problem List.    Medications (Active prior to today's visit):  Current Outpatient Medications   Medication Sig Dispense Refill    cephalexin (KEFLEX) 500 MG Oral Cap Take 1 capsule (500 mg total) by mouth 4 (four) times daily for 10 days. 40 capsule 0    Norethindrone (ORTHO MICRONOR) 0.35 MG Oral Tab Take 1 tablet (0.35 mg total) by mouth daily. 84 tablet 3    ibuprofen 600 MG Oral Tab Take 1 tablet (600 mg total) by mouth every 6 (six) hours as needed for Pain. (Patient not taking: Reported on 2024) 30 tablet 0    Prenatal Vit-Fe Sulfate-FA-DHA (PRENATAL VITAMIN/MIN +DHA) 27-0.8-200 MG Oral Cap Take 1 capsule by mouth daily. (Patient not taking: Reported on 2024) 30 capsule 11       Allergies:  No Known Allergies    ROS:  Review of  Systems   Constitutional: Negative.    Respiratory: Negative.     Cardiovascular: Negative.    Gastrointestinal: Negative.    Endocrine: Negative.    Genitourinary:  Positive for vaginal bleeding. Negative for decreased urine volume, difficulty urinating, menstrual problem, urgency, vaginal discharge and vaginal pain.   Musculoskeletal: Negative.    Neurological: Negative.    Hematological: Negative.    Psychiatric/Behavioral: Negative.         PHYSICAL EXAM:  Vitals:    05/17/24 1400   BP: 103/73   Pulse: 76     Physical Exam  Vitals reviewed.   Constitutional:       Appearance: Normal appearance.   HENT:      Head: Normocephalic.   Pulmonary:      Effort: Pulmonary effort is normal.   Genitourinary:     General: Normal vulva.      Vagina: No signs of injury and foreign body. Bleeding (slight ,from os) present. No vaginal discharge, erythema, tenderness, lesions or prolapsed vaginal walls.      Cervix: Normal. No cervical motion tenderness, discharge, friability, lesion, erythema, cervical bleeding or eversion.      Uterus: Tender. Not deviated, not enlarged, not fixed and no uterine prolapse.       Adnexa:         Right: No mass, tenderness or fullness.          Left: No mass, tenderness or fullness.     Skin:     General: Skin is warm and dry.   Neurological:      Mental Status: She is alert and oriented to person, place, and time.   Psychiatric:         Behavior: Behavior normal. Behavior is cooperative.         Thought Content: Thought content normal.         Judgment: Judgment normal.             ASSESSMENT/PLAN:     Carola was seen today for vaginal bleeding.    Diagnoses and all orders for this visit:    Endometritis  -     cephalexin (KEFLEX) 500 MG Oral Cap; Take 1 capsule (500 mg total) by mouth 4 (four) times daily for 10 days.  -     US PELVIS (TRANSABDOMINAL AND TRANSVAGINAL) (CPT=76856/28884); Future    Irregular bleeding  -     Urine Culture, Routine; Future  -     Urine Culture, Routine    Pelvic  ultrasound ordered today for 5:30pm to evaluate prolonged bleeding. Midwife on-call notified    Follow-up/Return to clinic: PRN if symptoms persist or worsen    Counseling:   Reviewed possible etiologies of prolonged bleeding: UTI, endometritis, variation of normal post-partum bleeding.  Keflex administration  Contraceptive method progesterone only pills- administration instructions, mechanism of action, safety profile in nursing    Patient verbalized understanding, All questions answered. No barriers to learning identified    Assessment and Plan Completed by Shirley SHI under the direct supervision of Clara Gordon CNM    Patient seen in conjunction with [facility]. I personally witnessed the patient's exam and medical decision making on this date of service.  I was physically present in the room for the performance of the E/M service.  I have reviewed the [employer] student's documentation and findings including history, Exam, and Medical Decision Making, edited the document for accuracy and verify that it represents the clinical findings and services performed.

## 2024-05-17 NOTE — PROGRESS NOTES
Patient seen in conjunction with [facility]. I personally witnessed the patient's exam and medical decision making on this date of service.  I was physically present in the room for the performance of the E/M service.  I have reviewed the CNM student's documentation and findings including history, Exam, and Medical Decision Making, edited the document for accuracy and verify that it represents the clinical findings and services performed.

## 2024-05-21 ENCOUNTER — HOSPITAL ENCOUNTER (OUTPATIENT)
Dept: ULTRASOUND IMAGING | Age: 24
Discharge: HOME | End: 2024-05-21
Attending: ADVANCED PRACTICE MIDWIFE

## 2024-05-21 DIAGNOSIS — N71.9 ENDOMETRITIS: ICD-10-CM

## 2024-05-21 PROCEDURE — 76830 TRANSVAGINAL US NON-OB: CPT | Performed by: ADVANCED PRACTICE MIDWIFE

## 2024-05-21 PROCEDURE — 76856 US EXAM PELVIC COMPLETE: CPT | Performed by: ADVANCED PRACTICE MIDWIFE

## 2024-07-01 ENCOUNTER — MED REC SCAN ONLY (OUTPATIENT)
Dept: OBGYN CLINIC | Facility: CLINIC | Age: 24
End: 2024-07-01

## 2025-02-24 ENCOUNTER — HOSPITAL ENCOUNTER (OUTPATIENT)
Age: 25
Discharge: HOME | End: 2025-02-24
Attending: EMERGENCY MEDICINE
Payer: MEDICAID

## 2025-02-24 VITALS
OXYGEN SATURATION: 99 % | RESPIRATION RATE: 16 BRPM | DIASTOLIC BLOOD PRESSURE: 85 MMHG | TEMPERATURE: 99 F | HEART RATE: 98 BPM | SYSTOLIC BLOOD PRESSURE: 123 MMHG

## 2025-02-24 DIAGNOSIS — R19.7 VOMITING AND DIARRHEA: Primary | ICD-10-CM

## 2025-02-24 DIAGNOSIS — R11.10 VOMITING AND DIARRHEA: Primary | ICD-10-CM

## 2025-02-24 DIAGNOSIS — R10.9 ABDOMINAL PAIN OF UNKNOWN ETIOLOGY: ICD-10-CM

## 2025-02-24 LAB
B-HCG UR QL: NEGATIVE
BILIRUB UR QL STRIP: NEGATIVE
COLOR UR: YELLOW
GLUCOSE UR STRIP-MCNC: NEGATIVE MG/DL
KETONES UR STRIP-MCNC: NEGATIVE MG/DL
LEUKOCYTE ESTERASE UR QL STRIP: NEGATIVE
NITRITE UR QL STRIP: NEGATIVE
PH UR STRIP: 7 [PH]
PROT UR STRIP-MCNC: 30 MG/DL
SP GR UR STRIP: 1.02
UROBILINOGEN UR STRIP-ACNC: <2 MG/DL

## 2025-02-24 PROCEDURE — 81002 URINALYSIS NONAUTO W/O SCOPE: CPT

## 2025-02-24 PROCEDURE — 81025 URINE PREGNANCY TEST: CPT

## 2025-02-24 PROCEDURE — 99213 OFFICE O/P EST LOW 20 MIN: CPT

## 2025-02-24 PROCEDURE — 99204 OFFICE O/P NEW MOD 45 MIN: CPT

## 2025-02-24 RX ORDER — ONDANSETRON 4 MG/1
4 TABLET, ORALLY DISINTEGRATING ORAL EVERY 4 HOURS PRN
Qty: 10 TABLET | Refills: 0 | Status: SHIPPED | OUTPATIENT
Start: 2025-02-24 | End: 2025-03-03

## 2025-02-24 NOTE — DISCHARGE INSTRUCTIONS
Follow-up for further evaluation primary physician and your OB/GYN as discussed.  Return if fever, increased pain, new or worse symptoms.  Clear liquids, advance diet as tolerated.  Zofran as needed.  Imodium as discussed.  Use Tylenol ibuprofen as needed.

## 2025-02-24 NOTE — ED INITIAL ASSESSMENT (HPI)
Patient reports severe menstrual cramps today.  Patient reports it doesn't feel like the regular menstrual cramps and is more severe.  Patient reports her period came in 19 days and she is usually at 30 days.

## 2025-03-12 ENCOUNTER — HOSPITAL ENCOUNTER (OUTPATIENT)
Age: 25
Discharge: HOME | End: 2025-03-12
Payer: MEDICAID

## 2025-03-12 VITALS
HEART RATE: 98 BPM | BODY MASS INDEX: 20.09 KG/M2 | SYSTOLIC BLOOD PRESSURE: 135 MMHG | RESPIRATION RATE: 18 BRPM | TEMPERATURE: 97 F | HEIGHT: 67 IN | WEIGHT: 128 LBS | DIASTOLIC BLOOD PRESSURE: 80 MMHG | OXYGEN SATURATION: 98 %

## 2025-03-12 DIAGNOSIS — N94.10 DYSPAREUNIA, FEMALE: Primary | ICD-10-CM

## 2025-03-12 LAB
B-HCG UR QL: NEGATIVE
BILIRUB UR QL STRIP: NEGATIVE
CLARITY UR: CLEAR
COLOR UR: YELLOW
GLUCOSE UR STRIP-MCNC: NEGATIVE MG/DL
KETONES UR STRIP-MCNC: NEGATIVE MG/DL
LEUKOCYTE ESTERASE UR QL STRIP: NEGATIVE
NITRITE UR QL STRIP: NEGATIVE
PH UR STRIP: 6.5 [PH]
SP GR UR STRIP: 1.02
UROBILINOGEN UR STRIP-ACNC: <2 MG/DL

## 2025-03-12 PROCEDURE — 81514 NFCT DS BV&VAGINITIS DNA ALG: CPT | Performed by: NURSE PRACTITIONER

## 2025-03-12 PROCEDURE — 99214 OFFICE O/P EST MOD 30 MIN: CPT

## 2025-03-12 PROCEDURE — 81002 URINALYSIS NONAUTO W/O SCOPE: CPT

## 2025-03-12 PROCEDURE — 87591 N.GONORRHOEAE DNA AMP PROB: CPT | Performed by: NURSE PRACTITIONER

## 2025-03-12 PROCEDURE — 87491 CHLMYD TRACH DNA AMP PROBE: CPT | Performed by: NURSE PRACTITIONER

## 2025-03-12 PROCEDURE — 81025 URINE PREGNANCY TEST: CPT

## 2025-03-12 NOTE — DISCHARGE INSTRUCTIONS
Abstain from intercourse.  We will contact you if your swabs are positive.  Follow closely with your OB/GYN as discussed.

## 2025-03-12 NOTE — ED PROVIDER NOTES
Patient Seen in: [facility]      History     Chief Complaint   Patient presents with    Eval-G     Stated Complaint: Eval-G    Subjective:   This is a 24-year-old female with below stated medical history.  Presents to immediate care for an episode of dyspareunia.  Reports pain was so severe while having intercourse this morning she vomited once.  No current abdominal pain or urinary symptoms.  States her last menstrual cycle ended 1 week ago and she has had some dried blood in her vaginal canal since.  Reports she is in a monogamous relationship and has no concern for STI.  No vaginal discharge.  No urinary symptoms.  She was seen a few weeks ago here in the clinic for abdominal pain that self resolved after intercourse.  She did not follow-up.  No treatment attempted prior to arrival.     The history is provided by the patient.             Objective:     Past Medical History:    Anemia    Spontaneous miscarriage (HCC)              History reviewed. No pertinent surgical history.             Social History     Socioeconomic History    Marital status: Single   Tobacco Use    Smoking status: Never    Tobacco comments:     No smokers in home.    Substance and Sexual Activity    Alcohol use: Not Currently    Drug use: Never     Social Drivers of Health     Food Insecurity: Not on File (9/26/2024)    Received from [facility]    Food Insecurity     Food: 0   Transportation Needs: No Transportation Needs (3/18/2024)    Transportation Needs     Lack of Transportation: No   Stress: No Stress Concern Present (3/18/2024)    Stress     Feeling of Stress : No   Housing Stability: Low Risk  (3/18/2024)    Housing Stability     Housing Instability: No              Review of Systems   Constitutional:  Negative for chills and fever.   HENT:  Negative for congestion and sore throat.    Respiratory:  Negative for cough.    Cardiovascular:  Negative for chest pain, palpitations and leg swelling.   Gastrointestinal:   Positive for abdominal pain, nausea and vomiting. Negative for constipation and diarrhea.   Genitourinary:  Positive for vaginal bleeding. Negative for dysuria, flank pain, genital sores and vaginal discharge.   Musculoskeletal:  Negative for back pain, neck pain and neck stiffness.   Skin:  Negative for rash.   Neurological:  Negative for headaches.       Positive for stated complaint: Eval-G  Other systems are as noted in HPI.  Constitutional and vital signs reviewed.      All other systems reviewed and negative except as noted above.    Physical Exam     ED Triage Vitals [03/12/25 1133]   /80   Pulse 98   Resp 18   Temp 97.4 °F (36.3 °C)   Temp src Oral   SpO2 98 %   O2 Device None (Room air)       Current Vitals:   Vital Signs  BP: 135/80  Pulse: 98  Resp: 18  Temp: 97.4 °F (36.3 °C)  Temp src: Oral    Oxygen Therapy  SpO2: 98 %  O2 Device: None (Room air)        Physical Exam  Vitals and nursing note reviewed.   Constitutional:       General: She is not in acute distress.     Appearance: Normal appearance. She is not ill-appearing, toxic-appearing or diaphoretic.   HENT:      Head: Normocephalic and atraumatic.      Right Ear: External ear normal.      Left Ear: External ear normal.      Nose: Nose normal.      Mouth/Throat:      Mouth: Mucous membranes are moist.      Pharynx: Oropharynx is clear.   Eyes:      General:         Right eye: No discharge.         Left eye: No discharge.      Extraocular Movements: Extraocular movements intact.      Conjunctiva/sclera: Conjunctivae normal.   Cardiovascular:      Rate and Rhythm: Normal rate and regular rhythm.      Heart sounds: Normal heart sounds. No murmur heard.  Pulmonary:      Effort: Pulmonary effort is normal. No respiratory distress.      Breath sounds: Normal breath sounds. No stridor. No wheezing, rhonchi or rales.   Abdominal:      General: There is no distension.      Palpations: Abdomen is soft. There is no mass.      Tenderness: There is no  abdominal tenderness. There is no right CVA tenderness, left CVA tenderness, guarding or rebound.      Hernia: No hernia is present.   Musculoskeletal:      Cervical back: Neck supple.      Right lower leg: No edema.      Left lower leg: No edema.   Skin:     General: Skin is warm and dry.      Capillary Refill: Capillary refill takes less than 2 seconds.      Findings: No rash.   Neurological:      Mental Status: She is alert and oriented to person, place, and time.   Psychiatric:         Mood and Affect: Mood normal.         Behavior: Behavior normal.             ED Course     Labs Reviewed   [facility] POCT URINALYSIS DIPSTICK - Abnormal; Notable for the following components:       Result Value    Protein urine Trace (*)     Blood, Urine Trace-Intact (*)     All other components within normal limits   POCT PREGNANCY URINE - Normal   CHLAMYDIA/GONOCOCCUS, ROSALIO   VAGINITIS VAGINOSIS PCR PANEL            UA, urine preg, vag swabs       MDM        Vital signs stable.  Patient is well-appearing and nontoxic looking.  Presents to immediate care for dyspareunia with 1 episode of vomiting.    Differential diagnosis includes but is not limited to UTI, pyelonephritis, vaginitis, endometriosis, ovarian cyst, STI, less likely PID    Abdominal exam is benign.  No suspicion for acute abdominal process or ruptured ovarian cyst.    UA appears uninfected.  Urine pregnancy is negative.    Speculum pelvic exam performed with nursing staff chaperone.  Vaginal swabs obtained.  Cervix is tender and erythemic.  There appears to be normal physiological discharge.  Patient states she has low to no risk for STI.  Less likely this is PID.    No specific etiology to explain dyspareunia.  Highly likely patient has endometriosis.    DC home.  We discussed no intercourse.  We will contact her vaginal swabs are positive.  Close follow-up with OB/GYN highly recommended.  She verbalized understanding, and agreed with plan of care.  All questions  answered.      Medical Decision Making      Disposition and Plan     Clinical Impression:  1. Dyspareunia, female         Disposition:  Discharge  3/12/2025 12:15 pm    Follow-up:  OB/GYN    In 1 week            Medications Prescribed:  Discharge Medication List as of 3/12/2025 12:17 PM              Supplementary Documentation:

## 2025-03-12 NOTE — ED INITIAL ASSESSMENT (HPI)
Presents for painful intercourse that occurred today. Vomited due to pain. Vaginal delivery about 1 year ago without complications.

## 2025-03-13 LAB
BV BACTERIA DNA VAG QL NAA+PROBE: NEGATIVE
C GLABRATA DNA VAG QL NAA+PROBE: NEGATIVE
C KRUSEI DNA VAG QL NAA+PROBE: NEGATIVE
C TRACH DNA SPEC QL NAA+PROBE: NEGATIVE
CANDIDA DNA VAG QL NAA+PROBE: POSITIVE
N GONORRHOEA DNA SPEC QL NAA+PROBE: NEGATIVE
T VAGINALIS DNA VAG QL NAA+PROBE: NEGATIVE

## 2025-03-14 RX ORDER — FLUCONAZOLE 150 MG/1
150 TABLET ORAL ONCE
Qty: 1 TABLET | Refills: 0 | Status: SHIPPED | OUTPATIENT
Start: 2025-03-14 | End: 2025-03-14

## 2025-03-14 NOTE — ED PROVIDER NOTES
Vaginitis profile positive for Candida.  Sent in Diflucan.  Will have the charge nurse call notify patient.  Should follow-up with primary care or OB.

## (undated) NOTE — LETTER
Date & Time: 2/24/2025, 1:44 PM  Patient: Carola Fox  Encounter Provider(s):    Moiz García MD       To Whom It May Concern:    Carola Fox was seen and treated in our department on 2/24/2025. She should not return to work until 2/25/25 .    If you have any questions or concerns, please do not hesitate to call.        _____________________________  Physician/APC Signature

## (undated) NOTE — LETTER
8/2/2023              [address] [facility] [address]         To Whom it may concern: This is to certify that Jluis Luz had an appointment on 8/2/2023  with Mitchell Back CNM.         Sincerely,    Mitchell Back CNM  [facility] [address], [address] [address] [phone]

## (undated) NOTE — LETTER
Date & Time: 2/8/2022, 5:02 PM  Patient: Avtar Prescott  Encounter Provider(s):    DO Graeme Quiroz PA-C       To Whom It May Concern:    Avtar Prescott was seen and treated in our department on 2/8/2022. She should not return to work until 2/12/22.     If you have any questions or concerns, please do not hesitate to call.        _____________________________  Physician/APC Signature

## (undated) NOTE — LETTER
[facility]  Administration of Anesthesia  ICarola agree to be cared for by a physician anesthesiologist alone and/or with a nurse anesthetist, who is specially trained to monitor me and give me medicine to put me to sleep or keep me comfortable during my procedure    I understand that my anesthesiologist and/or anesthetist is not an employee or agent of [facility] or [employer]. He or she works for [facility]    As the patient asking for anesthesia services, I agree to:  Allow the anesthesiologist (anesthesia doctor) to give me medicine and do additional procedures as necessary. Some examples are: Starting or using an “IV” to give me medicine, fluids or blood during my procedure, and having a breathing tube placed to help me breathe when I’m asleep (intubation). In the event that my heart stops working properly, I understand that my anesthesiologist will make every effort to sustain my life, unless otherwise directed by [facility] Do Not Resuscitate documents.  Tell my anesthesia doctor before my procedure:  If I am pregnant.  The last time that I ate or drank.  iii. All of the medicines I take (including prescriptions, herbal supplements, and pills I can buy without a prescription (including street drugs/illegal medications). Failure to inform my anesthesiologist about these medicines may increase my risk of anesthetic complications.  iv.If I am allergic to anything or have had a reaction to anesthesia before.  I understand how the anesthesia medicine will help me (benefits).  I understand that with any type of anesthesia medicine there are risks:  The most common risks are: nausea, vomiting, sore throat, muscle soreness, damage to my eyes, mouth, or teeth (from breathing tube placement).  Rare risks include: remembering what happened during my procedure, allergic reactions to medications, injury to my airway, heart, lungs, vision, nerves, or  muscles and in extremely rare instances death.  My doctor has explained to me other choices available to me for my care (alternatives).  Pregnant Patients (“epidural”):  I understand that the risks of having an epidural (medicine given into my back to help control pain during labor), include itching, low blood pressure, difficulty urinating, headache or slowing of the baby’s heart. Very rare risks include infection, bleeding, seizure, irregular heart rhythms and nerve injury.  Regional Anesthesia (“spinal”, “epidural”, & “nerve blocks”):  I understand that rare but potential complications include headache, bleeding, infection, seizure, irregular heart rhythms, and nerve injury.    _____________________________________________________________________________  Patient (or Representative) Signature/Relationship to Patient  Date   Time    _____________________________________________________________________________   Name (if used)    Language/Organization   Time    _____________________________________________________________________________  Nurse Anesthetist Signature     Date   Time  _____________________________________________________________________________  Anesthesiologist Signature     Date   Time  I have discussed the procedure and information above with the patient (or patient’s representative) and answered their questions. The patient or their representative has agreed to have anesthesia services.    _____________________________________________________________________________  Witness        Date   Time  I have verified that the signature is that of the patient or patient’s representative, and that it was signed before the procedure  Patient Name: Carola Fox     : 2000                 Printed: 3/18/2024 at 8:38 AM    Medical Record #: [id number]                                            Page 1 of 1  ----------ANESTHESIA CONSENT----------

## (undated) NOTE — LETTER
1/8/2024              Carola Fox        [address] [place] [address]         Dear Carola,    Our records indicate that the tests ordered for you by Clara Gordon CNM  have not been done.  If you have, in fact, already completed the tests or you do not wish to have the tests done, please contact our office at THE NUMBER LISTED BELOW.  Otherwise, please proceed with the testing.  Enclosed is a duplicate order for your convenience.        Sincerely,    Clara Gordon CNM  [facility] [address] [address] [phone]

## (undated) NOTE — LETTER
Dear New Mom,    We hope you are doing well. If, for any reason, you have questions or concerns about your health or your baby’s health, please contact your provider or your pediatrician or family medicine physician regarding your baby.     At [facility], we feel that postpartum support is very important for new families. Please see the enclosed new parent support flyer that lists support programs and resources with both in-person and online options.     Additionally, our Breastfeeding Centers at [facility] and [facility] in [place], offer outpatient visits with our International Board-Certified Lactation   Consultants (IBCLCs) for any breastfeeding concerns or questions you may have.    For issues related to stress, anxiety or depression, we have a Nurturing Mom support group that meets both in-person or online.  There’s also a 24-hour Mom’s Line where you can request a phone call from a clinical therapist for assistance for postpartum depression.    We encourage you to take advantage of these programs and resources as you recover from childbirth and learn to care for your new infant.    Best wishes,    [facility]            [id number]